# Patient Record
Sex: MALE | Race: ASIAN | NOT HISPANIC OR LATINO | URBAN - METROPOLITAN AREA
[De-identification: names, ages, dates, MRNs, and addresses within clinical notes are randomized per-mention and may not be internally consistent; named-entity substitution may affect disease eponyms.]

---

## 2019-10-04 ENCOUNTER — HOSPITAL ENCOUNTER (INPATIENT)
Dept: SURGERY | Facility: CLINIC | Age: 64
Discharge: HOME | End: 2019-10-10
Attending: INTERNAL MEDICINE | Admitting: INTERNAL MEDICINE

## 2019-10-04 DIAGNOSIS — M62.81 GENERALIZED MUSCLE WEAKNESS: ICD-10-CM

## 2019-10-04 DIAGNOSIS — G89.18 POSTOPERATIVE PAIN: ICD-10-CM

## 2019-10-04 DIAGNOSIS — R10.11 ABDOMINAL PAIN, RIGHT UPPER QUADRANT: ICD-10-CM

## 2019-10-04 DIAGNOSIS — R10.11 RIGHT UPPER QUADRANT ABDOMINAL PAIN: ICD-10-CM

## 2019-10-04 DIAGNOSIS — E11.9 TYPE 2 DIABETES MELLITUS WITHOUT COMPLICATION, WITHOUT LONG-TERM CURRENT USE OF INSULIN (H): ICD-10-CM

## 2019-10-04 DIAGNOSIS — R79.89 ELEVATED LFTS: ICD-10-CM

## 2019-10-04 DIAGNOSIS — N17.9 AKI (ACUTE KIDNEY INJURY) (H): ICD-10-CM

## 2019-10-04 LAB
ALBUMIN SERPL-MCNC: 2.5 G/DL (ref 3.5–5)
ALBUMIN SERPL-MCNC: 3.3 G/DL (ref 3.5–5)
ALBUMIN UR-MCNC: ABNORMAL MG/DL
ALP SERPL-CCNC: 108 U/L (ref 45–120)
ALP SERPL-CCNC: 91 U/L (ref 45–120)
ALT SERPL W P-5'-P-CCNC: 54 U/L (ref 0–45)
ALT SERPL W P-5'-P-CCNC: 63 U/L (ref 0–45)
AMORPH CRY #/AREA URNS HPF: ABNORMAL /[HPF]
ANION GAP SERPL CALCULATED.3IONS-SCNC: 5 MMOL/L (ref 5–18)
ANION GAP SERPL CALCULATED.3IONS-SCNC: 8 MMOL/L (ref 5–18)
APPEARANCE UR: ABNORMAL
AST SERPL W P-5'-P-CCNC: 65 U/L (ref 0–40)
AST SERPL W P-5'-P-CCNC: 84 U/L (ref 0–40)
ATRIAL RATE - MUSE: 108 BPM
BACTERIA #/AREA URNS HPF: ABNORMAL HPF
BASOPHILS # BLD AUTO: 0 THOU/UL (ref 0–0.2)
BASOPHILS # BLD AUTO: 0 THOU/UL (ref 0–0.2)
BASOPHILS NFR BLD AUTO: 0 % (ref 0–2)
BASOPHILS NFR BLD AUTO: 0 % (ref 0–2)
BILIRUB SERPL-MCNC: 0.6 MG/DL (ref 0–1)
BILIRUB SERPL-MCNC: 1 MG/DL (ref 0–1)
BILIRUB UR QL STRIP: ABNORMAL
BUN SERPL-MCNC: 14 MG/DL (ref 8–22)
BUN SERPL-MCNC: 16 MG/DL (ref 8–22)
CALCIUM SERPL-MCNC: 7.7 MG/DL (ref 8.5–10.5)
CALCIUM SERPL-MCNC: 9.1 MG/DL (ref 8.5–10.5)
CHLORIDE BLD-SCNC: 103 MMOL/L (ref 98–107)
CHLORIDE BLD-SCNC: 109 MMOL/L (ref 98–107)
CO2 SERPL-SCNC: 22 MMOL/L (ref 22–31)
CO2 SERPL-SCNC: 25 MMOL/L (ref 22–31)
COLOR UR AUTO: YELLOW
CREAT SERPL-MCNC: 1.13 MG/DL (ref 0.7–1.3)
CREAT SERPL-MCNC: 1.4 MG/DL (ref 0.7–1.3)
DIASTOLIC BLOOD PRESSURE - MUSE: 58 MMHG
DOHLE BODIES: ABNORMAL
EOSINOPHIL # BLD AUTO: 0.1 THOU/UL (ref 0–0.4)
EOSINOPHIL COUNT (ABSOLUTE): 0 THOU/UL (ref 0–0.4)
EOSINOPHIL NFR BLD AUTO: 2 % (ref 0–6)
EOSINOPHIL NFR BLD AUTO: 2 % (ref 0–6)
ERYTHROCYTE [DISTWIDTH] IN BLOOD BY AUTOMATED COUNT: 12.8 % (ref 11–14.5)
ERYTHROCYTE [DISTWIDTH] IN BLOOD BY AUTOMATED COUNT: 12.8 % (ref 11–14.5)
GFR SERPL CREATININE-BSD FRML MDRD: 52 ML/MIN/1.73M2
GFR SERPL CREATININE-BSD FRML MDRD: >60 ML/MIN/1.73M2
GLUCOSE BLD-MCNC: 138 MG/DL (ref 70–125)
GLUCOSE BLD-MCNC: 92 MG/DL (ref 70–125)
GLUCOSE BLDC GLUCOMTR-MCNC: 80 MG/DL (ref 70–139)
GLUCOSE BLDC GLUCOMTR-MCNC: 98 MG/DL (ref 70–139)
GLUCOSE UR STRIP-MCNC: ABNORMAL MG/DL
GRAN CASTS #/AREA URNS LPF: ABNORMAL LPF
HCT VFR BLD AUTO: 33.8 % (ref 40–54)
HCT VFR BLD AUTO: 42.1 % (ref 40–54)
HGB BLD-MCNC: 11.3 G/DL (ref 14–18)
HGB BLD-MCNC: 14.1 G/DL (ref 14–18)
HGB UR QL STRIP: NEGATIVE
INR PPP: 1.43 (ref 0.9–1.1)
INTERPRETATION ECG - MUSE: NORMAL
KETONES UR STRIP-MCNC: NEGATIVE MG/DL
LACTATE SERPL-SCNC: 1 MMOL/L (ref 0.5–2.2)
LACTATE SERPL-SCNC: 1.6 MMOL/L (ref 0.5–2.2)
LACTATE SERPL-SCNC: 2.6 MMOL/L (ref 0.5–2.2)
LEUKOCYTE ESTERASE UR QL STRIP: NEGATIVE
LYMPHOCYTES # BLD AUTO: 0.3 THOU/UL (ref 0.8–4.4)
LYMPHOCYTES # BLD AUTO: 0.7 THOU/UL (ref 0.8–4.4)
LYMPHOCYTES NFR BLD AUTO: 15 % (ref 20–40)
LYMPHOCYTES NFR BLD AUTO: 22 % (ref 20–40)
MCH RBC QN AUTO: 32.1 PG (ref 27–34)
MCH RBC QN AUTO: 32.1 PG (ref 27–34)
MCHC RBC AUTO-ENTMCNC: 33.4 G/DL (ref 32–36)
MCHC RBC AUTO-ENTMCNC: 33.5 G/DL (ref 32–36)
MCV RBC AUTO: 96 FL (ref 80–100)
MCV RBC AUTO: 96 FL (ref 80–100)
MONOCYTES # BLD AUTO: 0.1 THOU/UL (ref 0–0.9)
MONOCYTES # BLD AUTO: 0.1 THOU/UL (ref 0–0.9)
MONOCYTES NFR BLD AUTO: 3 % (ref 2–10)
MONOCYTES NFR BLD AUTO: 8 % (ref 2–10)
MUCOUS THREADS #/AREA URNS LPF: ABNORMAL LPF
NEUTROPHILS # BLD AUTO: 2.2 THOU/UL (ref 2–7.7)
NEUTROPHILS NFR BLD AUTO: 72 % (ref 50–70)
NITRATE UR QL: NEGATIVE
OVALOCYTES: ABNORMAL
OVALOCYTES: ABNORMAL
P AXIS - MUSE: 56 DEGREES
PH UR STRIP: 6 [PH] (ref 4.5–8)
PLAT MORPH BLD: ABNORMAL
PLAT MORPH BLD: ABNORMAL
PLATELET # BLD AUTO: 68 THOU/UL (ref 140–440)
PLATELET # BLD AUTO: 90 THOU/UL (ref 140–440)
PMV BLD AUTO: 10.4 FL (ref 8.5–12.5)
PMV BLD AUTO: 10.5 FL (ref 8.5–12.5)
POLYCHROMASIA BLD QL SMEAR: ABNORMAL
POTASSIUM BLD-SCNC: 4 MMOL/L (ref 3.5–5)
POTASSIUM BLD-SCNC: 4.1 MMOL/L (ref 3.5–5)
PR INTERVAL - MUSE: 122 MS
PROT SERPL-MCNC: 5.2 G/DL (ref 6–8)
PROT SERPL-MCNC: 6.6 G/DL (ref 6–8)
QRS DURATION - MUSE: 80 MS
QT - MUSE: 302 MS
QTC - MUSE: 404 MS
R AXIS - MUSE: -54 DEGREES
RBC # BLD AUTO: 3.52 MILL/UL (ref 4.4–6.2)
RBC # BLD AUTO: 4.39 MILL/UL (ref 4.4–6.2)
RBC #/AREA URNS AUTO: ABNORMAL HPF
SODIUM SERPL-SCNC: 136 MMOL/L (ref 136–145)
SODIUM SERPL-SCNC: 136 MMOL/L (ref 136–145)
SP GR UR STRIP: 1.02 (ref 1–1.03)
SQUAMOUS #/AREA URNS AUTO: ABNORMAL LPF
SYSTOLIC BLOOD PRESSURE - MUSE: 110 MMHG
T AXIS - MUSE: 55 DEGREES
TOTAL NEUTROPHILS-ABS(DIFF): 1.4 THOU/UL (ref 2–7.7)
TOTAL NEUTROPHILS-REL(DIFF): 75 % (ref 50–70)
UROBILINOGEN UR STRIP-ACNC: ABNORMAL
VENTRICULAR RATE- MUSE: 108 BPM
WBC #/AREA URNS AUTO: ABNORMAL HPF
WBC: 1.8 THOU/UL (ref 4–11)
WBC: 3 THOU/UL (ref 4–11)

## 2019-10-04 RX ORDER — ACETAMINOPHEN 325 MG/1
325-650 TABLET ORAL EVERY 4 HOURS PRN
Status: SHIPPED | COMMUNITY
Start: 2019-10-04

## 2019-10-04 ASSESSMENT — MIFFLIN-ST. JEOR
SCORE: 1193.62
SCORE: 1193.62
SCORE: 1077.96
SCORE: 1077.96

## 2019-10-05 ENCOUNTER — SURGERY - HEALTHEAST (OUTPATIENT)
Dept: SURGERY | Facility: CLINIC | Age: 64
End: 2019-10-05

## 2019-10-05 ENCOUNTER — ANESTHESIA - HEALTHEAST (OUTPATIENT)
Dept: SURGERY | Facility: CLINIC | Age: 64
End: 2019-10-05

## 2019-10-05 LAB
ABO/RH(D): NORMAL
ALBUMIN SERPL-MCNC: 2.4 G/DL (ref 3.5–5)
ALBUMIN SERPL-MCNC: 2.4 G/DL (ref 3.5–5)
ALBUMIN SERPL-MCNC: 3.1 G/DL (ref 3.5–5)
ALBUMIN UR-MCNC: ABNORMAL MG/DL
ALP SERPL-CCNC: 116 U/L (ref 45–120)
ALP SERPL-CCNC: 87 U/L (ref 45–120)
ALP SERPL-CCNC: 88 U/L (ref 45–120)
ALT SERPL W P-5'-P-CCNC: 52 U/L (ref 0–45)
ALT SERPL W P-5'-P-CCNC: 53 U/L (ref 0–45)
ALT SERPL W P-5'-P-CCNC: 72 U/L (ref 0–45)
ANION GAP SERPL CALCULATED.3IONS-SCNC: 14 MMOL/L (ref 5–18)
ANION GAP SERPL CALCULATED.3IONS-SCNC: 6 MMOL/L (ref 5–18)
APPEARANCE UR: CLEAR
AST SERPL W P-5'-P-CCNC: 60 U/L (ref 0–40)
AST SERPL W P-5'-P-CCNC: 61 U/L (ref 0–40)
AST SERPL W P-5'-P-CCNC: 90 U/L (ref 0–40)
BACTERIA #/AREA URNS HPF: ABNORMAL HPF
BASE EXCESS BLDA CALC-SCNC: -2.7 MMOL/L
BASOPHILS # BLD AUTO: 0 THOU/UL (ref 0–0.2)
BASOPHILS NFR BLD AUTO: 0 % (ref 0–2)
BILIRUB DIRECT SERPL-MCNC: 0.3 MG/DL
BILIRUB SERPL-MCNC: 0.4 MG/DL (ref 0–1)
BILIRUB SERPL-MCNC: 0.4 MG/DL (ref 0–1)
BILIRUB SERPL-MCNC: 0.7 MG/DL (ref 0–1)
BILIRUB UR QL STRIP: NEGATIVE
BUN SERPL-MCNC: 13 MG/DL (ref 8–22)
BUN SERPL-MCNC: 14 MG/DL (ref 8–22)
C REACTIVE PROTEIN LHE: 14.8 MG/DL (ref 0–0.8)
CALCIUM SERPL-MCNC: 7.5 MG/DL (ref 8.5–10.5)
CALCIUM SERPL-MCNC: 8.2 MG/DL (ref 8.5–10.5)
CHLORIDE BLD-SCNC: 104 MMOL/L (ref 98–107)
CHLORIDE BLD-SCNC: 108 MMOL/L (ref 98–107)
CK SERPL-CCNC: 169 U/L (ref 30–190)
CK SERPL-CCNC: 207 U/L (ref 30–190)
CO2 SERPL-SCNC: 19 MMOL/L (ref 22–31)
CO2 SERPL-SCNC: 21 MMOL/L (ref 22–31)
COHGB MFR BLD: 100 % (ref 96–97)
COLOR UR AUTO: YELLOW
CORTIS SERPL-MCNC: 16.6 UG/DL
CREAT SERPL-MCNC: 1.02 MG/DL (ref 0.7–1.3)
CREAT SERPL-MCNC: 1.12 MG/DL (ref 0.7–1.3)
EOSINOPHIL COUNT (ABSOLUTE): 0.1 THOU/UL (ref 0–0.4)
EOSINOPHIL NFR BLD AUTO: 3 % (ref 0–6)
ERYTHROCYTE [DISTWIDTH] IN BLOOD BY AUTOMATED COUNT: 12.9 % (ref 11–14.5)
ERYTHROCYTE [DISTWIDTH] IN BLOOD BY AUTOMATED COUNT: 13 % (ref 11–14.5)
GFR SERPL CREATININE-BSD FRML MDRD: >60 ML/MIN/1.73M2
GFR SERPL CREATININE-BSD FRML MDRD: >60 ML/MIN/1.73M2
GLUCOSE BLD-MCNC: 139 MG/DL (ref 70–125)
GLUCOSE BLD-MCNC: 208 MG/DL (ref 70–125)
GLUCOSE BLDC GLUCOMTR-MCNC: 101 MG/DL (ref 70–139)
GLUCOSE BLDC GLUCOMTR-MCNC: 105 MG/DL (ref 70–139)
GLUCOSE BLDC GLUCOMTR-MCNC: 107 MG/DL (ref 70–139)
GLUCOSE BLDC GLUCOMTR-MCNC: 134 MG/DL (ref 70–139)
GLUCOSE BLDC GLUCOMTR-MCNC: 142 MG/DL (ref 70–139)
GLUCOSE BLDC GLUCOMTR-MCNC: 145 MG/DL (ref 70–139)
GLUCOSE BLDC GLUCOMTR-MCNC: 209 MG/DL (ref 70–139)
GLUCOSE BLDC GLUCOMTR-MCNC: 61 MG/DL (ref 70–139)
GLUCOSE UR STRIP-MCNC: NEGATIVE MG/DL
HCO3, ARTERIAL CALC - HISTORICAL: 22.8 MMOL/L (ref 23–29)
HCT VFR BLD AUTO: 33.4 % (ref 40–54)
HCT VFR BLD AUTO: 40.3 % (ref 40–54)
HGB BLD-MCNC: 11.3 G/DL (ref 14–18)
HGB BLD-MCNC: 12.7 G/DL (ref 14–18)
HGB UR QL STRIP: ABNORMAL
INR PPP: 1.48 (ref 0.9–1.1)
KETONES UR STRIP-MCNC: ABNORMAL MG/DL
LACTATE SERPL-SCNC: 1 MMOL/L (ref 0.5–2.2)
LACTATE SERPL-SCNC: 8 MMOL/L (ref 0.5–2.2)
LEUKOCYTE ESTERASE UR QL STRIP: NEGATIVE
LIPASE SERPL-CCNC: 33 U/L (ref 0–52)
LYMPHOCYTES # BLD AUTO: 1.4 THOU/UL (ref 0.8–4.4)
LYMPHOCYTES NFR BLD AUTO: 46 % (ref 20–40)
MCH RBC QN AUTO: 31.6 PG (ref 27–34)
MCH RBC QN AUTO: 32.4 PG (ref 27–34)
MCHC RBC AUTO-ENTMCNC: 31.5 G/DL (ref 32–36)
MCHC RBC AUTO-ENTMCNC: 33.8 G/DL (ref 32–36)
MCV RBC AUTO: 100 FL (ref 80–100)
MCV RBC AUTO: 96 FL (ref 80–100)
METAMYELOCYTES (ABSOLUTE): 0 THOU/UL
METAMYELOCYTES NFR BLD MANUAL: 1 %
MONOCYTES # BLD AUTO: 0.2 THOU/UL (ref 0–0.9)
MONOCYTES NFR BLD AUTO: 5 % (ref 2–10)
MONOCYTES NFR BLD AUTO: NEGATIVE %
NITRATE UR QL: NEGATIVE
O2/TOTAL GAS SETTING VFR VENT: 70 %
OVALOCYTES: ABNORMAL
OXYHEMOGLOBIN - HISTORICAL: 96.6 % (ref 96–97)
PCO2 BLD: 35 MM HG (ref 35–45)
PEEP: 6 CM H2O
PH BLD: 7.4 [PH] (ref 7.37–7.44)
PH UR STRIP: 6 [PH] (ref 4.5–8)
PLAT MORPH BLD: NORMAL
PLATELET # BLD AUTO: 147 THOU/UL (ref 140–440)
PLATELET # BLD AUTO: 56 THOU/UL (ref 140–440)
PMV BLD AUTO: 10.1 FL (ref 8.5–12.5)
PMV BLD AUTO: 10.9 FL (ref 8.5–12.5)
PO2 BLD: 74 MM HG (ref 80–90)
POTASSIUM BLD-SCNC: 3.8 MMOL/L (ref 3.5–5)
POTASSIUM BLD-SCNC: 4.6 MMOL/L (ref 3.5–5)
PROLACTIN SERPL-MCNC: 12.4 NG/ML (ref 0–15)
PROT SERPL-MCNC: 5 G/DL (ref 6–8)
PROT SERPL-MCNC: 5 G/DL (ref 6–8)
PROT SERPL-MCNC: 6.5 G/DL (ref 6–8)
PSV (LAB BLOOD GAS): 6 CM H2O
RATE: 16 RR/MIN
RBC # BLD AUTO: 3.49 MILL/UL (ref 4.4–6.2)
RBC # BLD AUTO: 4.02 MILL/UL (ref 4.4–6.2)
RBC #/AREA URNS AUTO: ABNORMAL HPF
SODIUM SERPL-SCNC: 135 MMOL/L (ref 136–145)
SODIUM SERPL-SCNC: 137 MMOL/L (ref 136–145)
SP GR UR STRIP: 1.05 (ref 1–1.03)
SQUAMOUS #/AREA URNS AUTO: ABNORMAL LPF
T4 FREE SERPL-MCNC: 0.9 NG/DL (ref 0.7–1.8)
TEMPERATURE: 37 DEGREES C
TOTAL NEUTROPHILS-ABS(DIFF): 1.4 THOU/UL (ref 2–7.7)
TOTAL NEUTROPHILS-REL(DIFF): 46 % (ref 50–70)
TROPONIN I SERPL-MCNC: <0.01 NG/ML (ref 0–0.29)
TSH SERPL DL<=0.005 MIU/L-ACNC: 2.39 UIU/ML (ref 0.3–5)
UROBILINOGEN UR STRIP-ACNC: ABNORMAL
VENTILATION MODE: ABNORMAL
WBC #/AREA URNS AUTO: ABNORMAL HPF
WBC: 1.8 THOU/UL (ref 4–11)
WBC: 3 THOU/UL (ref 4–11)

## 2019-10-06 LAB
ALBUMIN SERPL-MCNC: 2.4 G/DL (ref 3.5–5)
ALP SERPL-CCNC: 81 U/L (ref 45–120)
ALT SERPL W P-5'-P-CCNC: 58 U/L (ref 0–45)
ANION GAP SERPL CALCULATED.3IONS-SCNC: 7 MMOL/L (ref 5–18)
AST SERPL W P-5'-P-CCNC: 75 U/L (ref 0–40)
BASOPHILS # BLD AUTO: 0 THOU/UL (ref 0–0.2)
BASOPHILS NFR BLD AUTO: 0 % (ref 0–2)
BILIRUB SERPL-MCNC: 0.5 MG/DL (ref 0–1)
BLD PROD TYP BPU: NORMAL
BLD PROD TYP BPU: NORMAL
BLOOD EXPIRATION DATE: NORMAL
BLOOD EXPIRATION DATE: NORMAL
BLOOD TYPE: 5100
BLOOD TYPE: 7300
BUN SERPL-MCNC: 15 MG/DL (ref 8–22)
CALCIUM SERPL-MCNC: 7.6 MG/DL (ref 8.5–10.5)
CHLORIDE BLD-SCNC: 107 MMOL/L (ref 98–107)
CO2 SERPL-SCNC: 24 MMOL/L (ref 22–31)
CODING SYSTEM: NORMAL
CODING SYSTEM: NORMAL
COMPONENT (HISTORICAL CONVERSION): NORMAL
COMPONENT (HISTORICAL CONVERSION): NORMAL
CREAT SERPL-MCNC: 0.96 MG/DL (ref 0.7–1.3)
EOSINOPHIL COUNT (ABSOLUTE): 0 THOU/UL (ref 0–0.4)
EOSINOPHIL NFR BLD AUTO: 0 % (ref 0–6)
ERYTHROCYTE [DISTWIDTH] IN BLOOD BY AUTOMATED COUNT: 12.9 % (ref 11–14.5)
GFR SERPL CREATININE-BSD FRML MDRD: >60 ML/MIN/1.73M2
GLUCOSE BLD-MCNC: 79 MG/DL (ref 70–125)
GLUCOSE BLDC GLUCOMTR-MCNC: 77 MG/DL (ref 70–139)
GLUCOSE BLDC GLUCOMTR-MCNC: 78 MG/DL (ref 70–139)
GLUCOSE BLDC GLUCOMTR-MCNC: 83 MG/DL (ref 70–139)
GLUCOSE BLDC GLUCOMTR-MCNC: 86 MG/DL (ref 70–139)
HBA1C MFR BLD: 7.5 % (ref 4.2–6.1)
HCT VFR BLD AUTO: 29.9 % (ref 40–54)
HGB BLD-MCNC: 10 G/DL (ref 14–18)
ISSUE DATE AND TIME: NORMAL
ISSUE DATE AND TIME: NORMAL
LYMPHOCYTES # BLD AUTO: 1.1 THOU/UL (ref 0.8–4.4)
LYMPHOCYTES NFR BLD AUTO: 21 % (ref 20–40)
MAGNESIUM SERPL-MCNC: 1.5 MG/DL (ref 1.8–2.6)
MCH RBC QN AUTO: 31.6 PG (ref 27–34)
MCHC RBC AUTO-ENTMCNC: 33.4 G/DL (ref 32–36)
MCV RBC AUTO: 95 FL (ref 80–100)
MONOCYTES # BLD AUTO: 0.1 THOU/UL (ref 0–0.9)
MONOCYTES NFR BLD AUTO: 1 % (ref 2–10)
OVALOCYTES: ABNORMAL
PLAT MORPH BLD: ABNORMAL
PLATELET # BLD AUTO: 95 THOU/UL (ref 140–440)
PMV BLD AUTO: 10.9 FL (ref 8.5–12.5)
POTASSIUM BLD-SCNC: 3.9 MMOL/L (ref 3.5–5)
PROT SERPL-MCNC: 5.1 G/DL (ref 6–8)
RBC # BLD AUTO: 3.16 MILL/UL (ref 4.4–6.2)
SODIUM SERPL-SCNC: 138 MMOL/L (ref 136–145)
STATUS (HISTORICAL CONVERSION): NORMAL
STATUS (HISTORICAL CONVERSION): NORMAL
TOTAL NEUTROPHILS-ABS(DIFF): 4.1 THOU/UL (ref 2–7.7)
TOTAL NEUTROPHILS-REL(DIFF): 78 % (ref 50–70)
UNIT ABO/RH (HISTORICAL CONVERSION): NORMAL
UNIT ABO/RH (HISTORICAL CONVERSION): NORMAL
UNIT NUMBER: NORMAL
UNIT NUMBER: NORMAL
WBC: 5.2 THOU/UL (ref 4–11)

## 2019-10-06 ASSESSMENT — MIFFLIN-ST. JEOR
SCORE: 1116.06
SCORE: 1116.06

## 2019-10-07 LAB
25(OH)D3 SERPL-MCNC: 16.5 NG/ML (ref 30–80)
ALBUMIN SERPL-MCNC: 2.3 G/DL (ref 3.5–5)
ALP SERPL-CCNC: 81 U/L (ref 45–120)
ALT SERPL W P-5'-P-CCNC: 55 U/L (ref 0–45)
ANION GAP SERPL CALCULATED.3IONS-SCNC: 8 MMOL/L (ref 5–18)
AST SERPL W P-5'-P-CCNC: 67 U/L (ref 0–40)
BASOPHILS # BLD AUTO: 0 THOU/UL (ref 0–0.2)
BASOPHILS NFR BLD AUTO: 0 % (ref 0–2)
BILIRUB SERPL-MCNC: 0.5 MG/DL (ref 0–1)
BUN SERPL-MCNC: 15 MG/DL (ref 8–22)
CALCIUM SERPL-MCNC: 7.4 MG/DL (ref 8.5–10.5)
CHLORIDE BLD-SCNC: 107 MMOL/L (ref 98–107)
CO2 SERPL-SCNC: 22 MMOL/L (ref 22–31)
CREAT SERPL-MCNC: 0.84 MG/DL (ref 0.7–1.3)
EOSINOPHIL # BLD AUTO: 0.1 THOU/UL (ref 0–0.4)
EOSINOPHIL NFR BLD AUTO: 2 % (ref 0–6)
ERYTHROCYTE [DISTWIDTH] IN BLOOD BY AUTOMATED COUNT: 13.2 % (ref 11–14.5)
GFR SERPL CREATININE-BSD FRML MDRD: >60 ML/MIN/1.73M2
GLUCOSE BLD-MCNC: 55 MG/DL (ref 70–125)
GLUCOSE BLDC GLUCOMTR-MCNC: 107 MG/DL (ref 70–139)
GLUCOSE BLDC GLUCOMTR-MCNC: 109 MG/DL (ref 70–139)
GLUCOSE BLDC GLUCOMTR-MCNC: 148 MG/DL (ref 70–139)
GLUCOSE BLDC GLUCOMTR-MCNC: 168 MG/DL (ref 70–139)
GLUCOSE BLDC GLUCOMTR-MCNC: 185 MG/DL (ref 70–139)
HCT VFR BLD AUTO: 28.4 % (ref 40–54)
HGB BLD-MCNC: 9.5 G/DL (ref 14–18)
LYMPHOCYTES # BLD AUTO: 1.1 THOU/UL (ref 0.8–4.4)
LYMPHOCYTES NFR BLD AUTO: 25 % (ref 20–40)
MAGNESIUM SERPL-MCNC: 1.9 MG/DL (ref 1.8–2.6)
MCH RBC QN AUTO: 32.1 PG (ref 27–34)
MCHC RBC AUTO-ENTMCNC: 33.5 G/DL (ref 32–36)
MCV RBC AUTO: 96 FL (ref 80–100)
MONOCYTES # BLD AUTO: 0.2 THOU/UL (ref 0–0.9)
MONOCYTES NFR BLD AUTO: 4 % (ref 2–10)
NEUTROPHILS # BLD AUTO: 3.1 THOU/UL (ref 2–7.7)
NEUTROPHILS NFR BLD AUTO: 68 % (ref 50–70)
PLATELET # BLD AUTO: 87 THOU/UL (ref 140–440)
PMV BLD AUTO: 10.8 FL (ref 8.5–12.5)
POTASSIUM BLD-SCNC: 3.5 MMOL/L (ref 3.5–5)
PROCALCITONIN SERPL-MCNC: 28.43 NG/ML (ref 0–0.49)
PROT SERPL-MCNC: 4.8 G/DL (ref 6–8)
RBC # BLD AUTO: 2.96 MILL/UL (ref 4.4–6.2)
SODIUM SERPL-SCNC: 137 MMOL/L (ref 136–145)
WBC: 4.5 THOU/UL (ref 4–11)

## 2019-10-07 ASSESSMENT — MIFFLIN-ST. JEOR
SCORE: 1105.17
SCORE: 1105.17

## 2019-10-08 LAB
CMV IGM SERPL IA-ACNC: <0.2 AI (ref 0–0.8)
GLUCOSE BLDC GLUCOMTR-MCNC: 142 MG/DL (ref 70–139)
GLUCOSE BLDC GLUCOMTR-MCNC: 162 MG/DL (ref 70–139)
GLUCOSE BLDC GLUCOMTR-MCNC: 181 MG/DL (ref 70–139)
GLUCOSE BLDC GLUCOMTR-MCNC: 92 MG/DL (ref 70–139)
INFLUENZA VIRUS A IGG AB [TITER] IN SERUM: ABNORMAL {TITER}
INFLUENZA VIRUS A IGM AB [TITER] IN SERUM: ABNORMAL {TITER}
LAB AP CHARGES (HE HISTORICAL CONVERSION): NORMAL
PATH REPORT.COMMENTS IMP SPEC: NORMAL
PATH REPORT.FINAL DX SPEC: NORMAL
PATH REPORT.GROSS SPEC: NORMAL
PATH REPORT.MICROSCOPIC SPEC OTHER STN: NORMAL
PATH REPORT.RELEVANT HX SPEC: NORMAL
PROCALCITONIN SERPL-MCNC: 9.44 NG/ML (ref 0–0.49)
RESULT FLAG (HE HISTORICAL CONVERSION): NORMAL

## 2019-10-09 LAB
AEROBIC BLOOD CULTURE BOTTLE: NO GROWTH
ANAEROBIC BLOOD CULTURE BOTTLE: NO GROWTH
ANAEROBIC BLOOD CULTURE BOTTLE: NO GROWTH
ANAEROBIC BLOOD CULTURE BOTTLE: NORMAL
GLUCOSE BLDC GLUCOMTR-MCNC: 148 MG/DL (ref 70–139)
GLUCOSE BLDC GLUCOMTR-MCNC: 156 MG/DL (ref 70–139)
GLUCOSE BLDC GLUCOMTR-MCNC: 165 MG/DL (ref 70–139)
GLUCOSE BLDC GLUCOMTR-MCNC: 99 MG/DL (ref 70–139)
INR PPP: 1.16 (ref 0.9–1.1)
PROCALCITONIN SERPL-MCNC: 7.23 NG/ML (ref 0–0.49)

## 2019-10-10 LAB — GLUCOSE BLDC GLUCOMTR-MCNC: 111 MG/DL (ref 70–139)

## 2019-10-10 RX ORDER — OXYCODONE HYDROCHLORIDE 5 MG/1
5 TABLET ORAL EVERY 4 HOURS PRN
Qty: 5 TABLET | Refills: 0 | Status: SHIPPED | OUTPATIENT
Start: 2019-10-10

## 2019-10-21 ENCOUNTER — AMBULATORY - HEALTHEAST (OUTPATIENT)
Dept: LAB | Facility: CLINIC | Age: 64
End: 2019-10-21

## 2019-10-21 ENCOUNTER — AMBULATORY - HEALTHEAST (OUTPATIENT)
Dept: SURGERY | Facility: CLINIC | Age: 64
End: 2019-10-21

## 2019-10-21 ENCOUNTER — OFFICE VISIT - HEALTHEAST (OUTPATIENT)
Dept: SURGERY | Facility: CLINIC | Age: 64
End: 2019-10-21

## 2019-10-21 DIAGNOSIS — R10.13 EPIGASTRIC PAIN: ICD-10-CM

## 2019-10-21 DIAGNOSIS — Z48.89 POSTOPERATIVE VISIT: ICD-10-CM

## 2019-10-21 LAB
ALBUMIN SERPL-MCNC: 3.9 G/DL (ref 3.5–5)
ALP SERPL-CCNC: 96 U/L (ref 45–120)
ALT SERPL W P-5'-P-CCNC: 30 U/L (ref 0–45)
AST SERPL W P-5'-P-CCNC: 19 U/L (ref 0–40)
BILIRUB DIRECT SERPL-MCNC: 0.2 MG/DL
BILIRUB SERPL-MCNC: 0.5 MG/DL (ref 0–1)
LIPASE SERPL-CCNC: 303 U/L (ref 0–52)
PROT SERPL-MCNC: 7.6 G/DL (ref 6–8)

## 2019-10-21 RX ORDER — METOCLOPRAMIDE 5 MG/1
5 TABLET ORAL
Qty: 90 TABLET | Refills: 0 | Status: SHIPPED | OUTPATIENT
Start: 2019-10-21

## 2019-10-21 ASSESSMENT — MIFFLIN-ST. JEOR: SCORE: 1023.53

## 2019-10-22 ENCOUNTER — AMBULATORY - HEALTHEAST (OUTPATIENT)
Dept: SURGERY | Facility: CLINIC | Age: 64
End: 2019-10-22

## 2019-10-22 ENCOUNTER — COMMUNICATION - HEALTHEAST (OUTPATIENT)
Dept: SURGERY | Facility: CLINIC | Age: 64
End: 2019-10-22

## 2019-10-22 DIAGNOSIS — R10.13 EPIGASTRIC ABDOMINAL PAIN: ICD-10-CM

## 2019-10-22 LAB
BASOPHILS # BLD AUTO: 0.1 THOU/UL (ref 0–0.2)
BASOPHILS NFR BLD AUTO: 1 % (ref 0–2)
EOSINOPHIL COUNT (ABSOLUTE): 0.1 THOU/UL (ref 0–0.4)
EOSINOPHIL NFR BLD AUTO: 2 % (ref 0–6)
ERYTHROCYTE [DISTWIDTH] IN BLOOD BY AUTOMATED COUNT: 13.6 % (ref 11–14.5)
HCT VFR BLD AUTO: 37.7 % (ref 40–54)
HGB BLD-MCNC: 12.4 G/DL (ref 14–18)
LYMPHOCYTES # BLD AUTO: 4.2 THOU/UL (ref 0.8–4.4)
LYMPHOCYTES NFR BLD AUTO: 61 % (ref 20–40)
MCH RBC QN AUTO: 32.5 PG (ref 27–34)
MCHC RBC AUTO-ENTMCNC: 32.9 G/DL (ref 32–36)
MCV RBC AUTO: 99 FL (ref 80–100)
MONOCYTES # BLD AUTO: 0.6 THOU/UL (ref 0–0.9)
MONOCYTES NFR BLD AUTO: 9 % (ref 2–10)
OVALOCYTES: ABNORMAL
PLAT MORPH BLD: NORMAL
PLATELET # BLD AUTO: 289 THOU/UL (ref 140–440)
PMV BLD AUTO: 10.4 FL (ref 8.5–12.5)
RBC # BLD AUTO: 3.82 MILL/UL (ref 4.4–6.2)
TOTAL NEUTROPHILS-ABS(DIFF): 2 THOU/UL (ref 2–7.7)
TOTAL NEUTROPHILS-REL(DIFF): 29 % (ref 50–70)
WBC: 7 THOU/UL (ref 4–11)

## 2019-10-23 ENCOUNTER — HOSPITAL ENCOUNTER (OUTPATIENT)
Dept: ULTRASOUND IMAGING | Facility: HOSPITAL | Age: 64
Discharge: HOME | End: 2019-10-23
Attending: PHYSICIAN ASSISTANT

## 2019-10-23 ENCOUNTER — RECORDS - HEALTHEAST (OUTPATIENT)
Dept: ADMINISTRATIVE | Facility: OTHER | Age: 64
End: 2019-10-23

## 2019-10-23 DIAGNOSIS — R10.13 EPIGASTRIC PAIN: ICD-10-CM

## 2019-10-28 ENCOUNTER — HOSPITAL ENCOUNTER (OUTPATIENT)
Dept: NUCLEAR MEDICINE | Facility: HOSPITAL | Age: 64
Discharge: HOME | End: 2019-10-28
Attending: PHYSICIAN ASSISTANT

## 2019-10-28 DIAGNOSIS — R10.13 EPIGASTRIC PAIN: ICD-10-CM

## 2020-04-22 ENCOUNTER — RECORDS - HEALTHEAST (OUTPATIENT)
Dept: ADMINISTRATIVE | Facility: OTHER | Age: 65
End: 2020-04-22

## 2020-06-19 ENCOUNTER — RECORDS - HEALTHEAST (OUTPATIENT)
Dept: ADMINISTRATIVE | Facility: OTHER | Age: 65
End: 2020-06-19

## 2020-06-22 ENCOUNTER — HOSPITAL ENCOUNTER (OUTPATIENT)
Dept: ULTRASOUND IMAGING | Facility: HOSPITAL | Age: 65
Discharge: HOME | End: 2020-06-22

## 2020-06-22 DIAGNOSIS — B18.1 CHRONIC HEPATITIS B (H): ICD-10-CM

## 2021-06-02 NOTE — PROGRESS NOTES
Hospitalist Progress Note     Assessment/Plan:     Amish Chua 63-year-old male medical history of,, chronic who presented with right upper quadrant abdominal pain, fever, and  significant leukopenia concerning for severe sepsis.  MRCP was performed which showed dilated CBD with inability to rule out ampullary stricture on MRCP.  Due to suspicion for probable cholecystitis he underwent laparoscopic cholecystectomy on 10/5/2019.  Postoperatively, he developed severe abdominal pain, tachycardia, and respiratory distress.  CT abdomen/pelvis did not show any postoperative complications, perforation, or gas.  Lactic acid was 8 and the patient had recurrent fever to 103.  Antibiotic coverage was broadened from initial Zosyn to include vancomycin.  He was subsequently transferred to the ICU for close monitoring of his respiratory status.  He subsequently recovered without any infectious source identified.  Currently doing well with return of bowel function to normal and able to tolerate p.o. intake.  Antibiotics D escalated to IV Zosyn with plan to discontinue tomorrow if procalcitonin is negative.    Active Problem:   Possible sepsis secondary to cholecystitis:  Leukopenia with mild neutropenia, resolved:  Thrombocytopenia likely due to underlying liver disease:  Chronic hepatitis B:  Coagulopathy of liver disease:  Acute hypoxic failure, resolved:  -Seems like patient's initial presentation is consistent with infectious etiology possibly from chronic cholecystitis but clinical picture is not fully convincing.  Dilated CBD on MRCP but ALP/T bili are unremarkable.  Clinical picture not consistent with cholangitis either.  Chest x-ray with minimal pulmonary vascular congestion but no pneumonia.  Repeat CT abdomen pelvis during rapid response for severe abdominal pain just with postoperative changes.  UA unremarkable.  Symptoms of high fever, body aches, tachycardia, and lactic acidosis have now resolved.  Although,  procalcitonin is a 28 with no ongoing other signs of infection.  On broad-spectrum antibiotics with blood culture results remaining negative to date..  -No significant bleeding despite low platelets and elevated INR.  Received vitamin K and platelets preop/perioperatively.    Plan:  -Continue Zosyn and discontinue vancomycin. Repeat procalcitonin in the morning. Appreciate ID antibiotic stewardship input.  -General surgery involved.    Vitamin D deficiency:  -Level low here.  Start 10,000 IU daily x30 days.  Recheck lab in 1 to 2 months.    Dyslipidemia:  -Discontinued statin due to liver disease.    Type 2 diabetes mellitus::  -Holding glipizide and metformin.  Intermittently hypoglycemic.  Continue sliding scale insulin.    DVT prophylaxis: SCDs.    Subjective:     No acute events overnight.  Remains on room air with normal bowel function and tolerating adequate p.o. intake.  Abdominal pain much better controlled.  Hemodynamically stable.  Discussed with the patient and his wife.    Review of systems:     Please see Subjective.    Current Medications:     Scheduled Meds:    cholecalciferol (vitamin D3)  10,000 Units Oral DAILY     heparin (PF)  5,000 Units Subcutaneous Q8H FIXED TIMES     influenza vaccine (age 50-64 YR or egg allergy) FLUBLOK quad (PF) inj 2019-20  0.5 mL Intramuscular Prior to Discharge     insulin aspart (NovoLOG) injection   Subcutaneous TID with meals     piperacillin-tazobactam  3.375 g Intravenous Q8H     sodium chloride  10-30 mL Intravenous Q8H FIXED TIMES     Continuous Infusions:    PRN Meds:.acetaminophen, dextrose 50 % (D50W), glucagon (human recombinant), HYDROcodone-acetaminophen, HYDROmorphone, naloxone **OR** naloxone, sodium chloride bacteriostatic, sodium chloride bacteriostatic, sodium chloride bacteriostatic, sodium chloride, sodium chloride, sodium chloride    Objective:       Vital signs in last 24 hours:     Temp:  [97.5  F (36.4  C)-99.6  F (37.6  C)] 99.6  F (37.6   C)  Heart Rate:  [] 84  Resp:  [13-28] 16  BP: ()/(56-69) 114/63  Weight: 116 lb (52.6 kg)    Physical Exam:     General appearance: Alert, Awake, No distress   Head & Neck Atraumatic, supple, no tracheal deviation   ENT  PER, conjunctiva clear, no scleral icterus, EOMI, no nasal discharge   Resp:  Breathing labored, tachypnea, clear to auscultation bilaterally, no wheezing, no crackles,   CVS: S1, S2 normal, regular rate and regular rhythm, no murmurs   GI: Soft, nontender, ND and BS+   MSK: No swelling, or tenderness   Neuro:  Alert and oriented ×3     Intake/Output this shift:     I/O last 3 completed shifts:  In: 751 [P.O.:320; IV Piggyback:431]  Out: 525 [Urine:525]    Pertinent Labs:     Lab Results: personally reviewed.     Pertinent Radiology:       Advanced Care Planning:   More than 35 minutes were spent with the patient and family with more than 50% of the time spent on counseling and coordination of care.     Barrier to discharge: Possible sepsis.  Anticipated LOS: To be determined  Disposition: To be determined    Lora Cid M.D.  [facility]  [facility]

## 2021-06-02 NOTE — PROGRESS NOTES
Physical Therapy     10/07/19 1325   Visit Specifics   Eval Type Initial eval   Inital PT Consult 10/07/19    Patient declined ;Family interpreted   Bed/Tabs/Pad Alarm Applied Yes   General   Onset date 10/04/19   Additional Pertinent History S/p laparoscopic cholecystectomy on 10/5/2019   Chart Reviewed Yes   PT/OT Patient/Caregiver Stated Goals none stated   Family/Caregiver Present Yes   Treatment Time   Gait Training 8   Precautions   General Precautions Abdominal;Bed alarm/Tab alarm   Home Living   Type of Home House   Home Layout One level;Stairs to enter without rails;Laundry in basement   Mobility Equipment   (none)   Prior Status   Independent With All ADL's/IADL's   Prior Device Use None of the above   Indoor Mobility Independent   Lives With Family   Receives Help From Family   Vocational Full time employment   Device Used No   Leisure Interests Spiritual activities   RLE Assessment   RLE Assessment WFL   LLE Assessment   LLE Assessment WFL   Bed Mobility   Supine to Sit Mod assist;Log roll;HOB elevated;Verbal cues   Bed Mobility Comments pt moves slow, cues on log-roll technique w/ fair follow through, needing much assist to sit up at EOB   Transfer    Sit To Stand CGA;Stand by assistance;Verbal cues   Stand To Sit Stand by assistance;Verbal cues    Additional Transfer Toilet   Toilet Stand by assistance;Verbal cues   Transfer Comments cues on safe hand placement, fww in front of pt   Ambulation    Weight Bearing Status WBAT   Distance (ft)  15' x 2   Assistance Close supervision;Minimal verbal cues   Assistive Device Rolling walker   Verbal Cues Attention to task;Device advancement;Safety;Sequencing   Quality of Gait/Comment slow gait, steady w/ device, seems guarded d/t post-op pain   Pattern Decreased pace;Decreased step length   Endurance Deficit   Endurance Deficit Yes   Endurance Deficit Description fatigue   Balance   Sitting Balance Independent   Standing Balance  Standby;Supported   Plan   Treatment/Interventions Functional transfer training;Gait/stair training;Strengthening/ROM;Neuromuscular re-ed   PT Frequency Daily   Assessment   Prognosis Excellent   Problem List Decreased mobility;Decreased endurance;Decreased strength;Impaired balance   Barriers to Discharge None   Recommendation   PT Discharge Recommendation Home with assist   PT Equipment Recommendation Rolling walker / FWW  (pending progress)   Treatment Suggestions for Next Session progress with bed mobility(log-roll), safe transfers, gait as able   PT Care Plan REVIEWED DAILY Yes, goals remain appropriate

## 2021-06-02 NOTE — ANESTHESIA POSTPROCEDURE EVALUATION
Patient: Amish Chua  CHOLECYSTECTOMY, LAPAROSCOPIC WITH INTRA-OP CHOLANGIOGRAMS  Anesthesia type: general    Patient location: PACU  Last vitals:   Vitals Value Taken Time   /75 10/5/2019 12:30 PM   Temp 37  C (98.6  F) 10/5/2019 12:15 PM   Pulse 100 10/5/2019 12:47 PM   Resp 20 10/5/2019 12:30 PM   SpO2 98 % 10/5/2019 12:30 PM   Vitals shown include unvalidated device data.  Post vital signs: stable  Level of consciousness: awake and responds to simple questions  Post-anesthesia pain: pain controlled  Post-anesthesia nausea and vomiting: no  Pulmonary: unassisted, nasal cannula  Cardiovascular: stable and blood pressure at baseline  Hydration: adequate  Anesthetic events: no    QCDR Measures:  ASA# 11 - Aishwarya-op Cardiac Arrest: ASA11B - Patient did NOT experience unanticipated cardiac arrest  ASA# 12 - Aishwarya-op Mortality Rate: ASA12B - Patient did NOT die  ASA# 13 - PACU Re-Intubation Rate: ASA13B - Patient did NOT require a new airway mgmt  ASA# 10 - Composite Anes Safety: ASA10A - No serious adverse event    Additional Notes:

## 2021-06-02 NOTE — PLAN OF CARE
Problem: Pain  Goal: Patient's pain/discomfort is manageable  Outcome: Progressing   Patient reports vicodin is adequate for abdominal pain. Administered x 1, will continue to monitor and treat as needed.       Problem: Potential for Compromised Skin Integrity  Goal: Nutritional status is improving  Outcome: Progressing   Tolerated regular diet this evening.

## 2021-06-02 NOTE — PROGRESS NOTES
"General Surgery Progress Note    1 Day Post-Op    CHOLECYSTECTOMY, LAPAROSCOPIC WITH INTRA-OP CHOLANGIOGRAMS    Subjective:   Pt is feeling some abdominal pressure and pain. He indicates pain at the incision sites and generalized pain that is waxing and waning in his lower abdomen and across the midsection. Patient states that he needs to \"poop\". He states his breathing is better since the BiPap was placed. Patient denies fever, chills, nausea, vomiting, CP, and leg pain.    Vitals:    10/06/19 0630 10/06/19 0645 10/06/19 0700 10/06/19 0715   BP: (!) 88/55 93/58 93/61 91/57   Patient Position:       Pulse: 81 84 86 82   Resp: 15 17 17 19   Temp:       TempSrc:       SpO2: 98% 97% 98% 99%   Weight:       Height:           Physical Exam:  General: NAD  Ab:       Soft, mildly distended, expected ttp POD1; The wound/ dressings are clean, dry, and intact  :      Patient is voiding adequate amount    Results from last 7 days   Lab Units 10/06/19  0507   LN-WHITE BLOOD CELL COUNT thou/uL 5.2   LN-HEMOGLOBIN g/dL 10.0*   LN-HEMATOCRIT % 29.9*   LN-PLATELET COUNT thou/uL 95*       Results from last 7 days   Lab Units 10/06/19  0507   LN-SODIUM mmol/L 138   LN-POTASSIUM mmol/L 3.9   LN-CHLORIDE mmol/L 107   LN-CO2 mmol/L 24   LN-BLOOD UREA NITROGEN mg/dL 15   LN-CREATININE mg/dL 0.96   LN-CALCIUM mg/dL 7.6*   LN-ALBUMIN g/dL 2.4*   LN-PROTEIN TOTAL g/dL 5.1*   LN-BILIRUBIN TOTAL mg/dL 0.5   LN-ALKALINE PHOSPHATASE U/L 81   LN-ALT (SGPT) U/L 58*   LN-AST (SGOT) U/L 75*       Assessment: Patient admitted with severe sepsis with suspicion of cholecystitis now status post laparoscopic cholecystectomy with negative cholangiograms.  Patient was hypotensive, tachycardic, and hypoxic after surgery and sent to the ICU.  He appears to be doing better this morning and from a purely surgical standpoint he is doing well.  Generalized abdominal pain could likely be bowels waking up in gas, however with the source of his infection " remaining elusive we should keep a close eye on this.  Gallbladder was not acutely inflamed and there was no signs of ascending cholangitis or biliary obstruction.  With patient not having any nausea or vomiting postoperatively and fevers under control, surgery is okay with starting clear liquid diet advancing as tolerated.  This may be contingent on his respiratory status and the need for BiPAP, so we will defer to the ICU team on what it is safe to start oral intake.    Plan:   Clear liquids and diet advancement as tolerated okay per surgery; defer to ICU team given respiratory status  [place] [facility] [place]  When able to ambulate, ambulate as tolerated    Tammy Neil, CNP  [phone]  [facility]

## 2021-06-02 NOTE — PROGRESS NOTES
Hospitalist Progress Note     Assessment/Plan:     Amish Chua 63-year-old male medical history of,, chronic who presented with right upper quadrant abdominal pain, fever, and  significant leukopenia concerning for severe sepsis.  MRCP was performed which showed dilated CBD with inability to rule out ampullary stricture on MRCP.  Due to suspicion for probable cholecystitis he underwent laparoscopic cholecystectomy on 10/5/2019.  Postoperatively, he developed severe abdominal pain, tachycardia, and respiratory distress.  CT abdomen/pelvis did not show any postoperative complications, perforation, or gas.  Lactic acid was 8 and the patient had recurrent fever to 103.  Antibiotic coverage was broadened from initial Zosyn to include vancomycin.  He was subsequently transferred to the ICU for close monitoring of his respiratory status.  He subsequently recovered without any infectious source identified.  Currently doing well with return of bowel function to normal and able to tolerate p.o. intake.  Antibiotics D escalated to IV Zosyn with plan to discontinue if repeat procalcitonin is negative.    Active Problem:   Possible sepsis secondary to cholecystitis:  Leukopenia with mild neutropenia, resolved:  Thrombocytopenia likely due to underlying liver disease:  Chronic hepatitis B:  Coagulopathy of liver disease:  Acute hypoxic failure, resolved:  -Seems like patient's initial presentation is consistent with infectious etiology possibly from chronic cholecystitis but clinical picture is not fully convincing.  Dilated CBD on MRCP but ALP/T bili are unremarkable.  Clinical picture not consistent with cholangitis either.  Chest x-ray with minimal pulmonary vascular congestion but no pneumonia.  Repeat CT abdomen pelvis during rapid response for severe abdominal pain just with postoperative changes.  UA unremarkable.  Symptoms of high fever, body aches, tachycardia, and lactic acidosis have now resolved.  Although,  procalcitonin is a 28 with no ongoing other signs of infection.  On broad-spectrum antibiotics with blood culture results remaining negative to date..  -No significant bleeding despite low platelets and elevated INR.  Received vitamin K and platelets preop/perioperatively.    Plan:  -Continue Zosyn, plan for 5-day course per antibiotic stewardship but procalcitonin is high from 10/7.  Repeat procalcitonin today not drawn, will reenter.  Consult ID for further recommendations if procalcitonin does not normalize..  -General surgery involved.  Okay to discharge from surgical standpoint.  -Check INR tomorrow morning.    Vitamin D deficiency:  -Level low here.  Started 10,000 IU daily x30 days.  Recheck lab in 1 to 2 months.    Dyslipidemia:  -Discontinued statin due to liver disease.    Type 2 diabetes mellitus::  -Holding glipizide and metformin.  Intermittently hypoglycemic.  Continue sliding scale insulin.    DVT prophylaxis: SCDs.    Subjective:     No acute events overnight.  Remains on room air with normal bowel function and tolerating adequate p.o. intake.  Abdominal pain much better controlled.  Patient reports muscular back pain.  Hemodynamically stable.  Discussed with the patient and his wife.  Family would like disability benefit form filled out and leave approved for the next 2 to 3 months.  Notify the family that patient can take 1 to 2 weeks of to recover but that I cannot sign a disability form for simple gallbladder surgery.    Review of systems:     Please see Subjective.    Current Medications:     Scheduled Meds:    cholecalciferol (vitamin D3)  10,000 Units Oral DAILY     heparin (PF)  5,000 Units Subcutaneous Q8H FIXED TIMES     influenza vaccine (age 50-64 YR or egg allergy) FLUBLOK quad (PF) inj 2019-20  0.5 mL Intramuscular Prior to Discharge     insulin aspart (NovoLOG) injection   Subcutaneous TID with meals     piperacillin-tazobactam  3.375 g Intravenous Q8H     sodium chloride  10-30 mL  Intravenous Q8H FIXED TIMES     Continuous Infusions:    PRN Meds:.acetaminophen, dextrose 50 % (D50W), glucagon (human recombinant), HYDROcodone-acetaminophen, HYDROmorphone, naloxone **OR** naloxone, sodium chloride bacteriostatic, sodium chloride bacteriostatic, sodium chloride bacteriostatic, sodium chloride, sodium chloride, sodium chloride    Objective:       Vital signs in last 24 hours:     Temp:  [98.1  F (36.7  C)-99.3  F (37.4  C)] 98.6  F (37  C)  Heart Rate:  [67-89] 68  Resp:  [16-20] 16  BP: ()/(52-75) 130/75  Weight: 116 lb (52.6 kg)    Physical Exam:     General appearance: Alert, Awake, No distress   Head & Neck Atraumatic, supple, no tracheal deviation   ENT  PER, conjunctiva clear, no scleral icterus, EOMI, no nasal discharge   Resp:  Breathing labored, tachypnea, clear to auscultation bilaterally, no wheezing, no crackles,   CVS: S1, S2 normal, regular rate and regular rhythm, no murmurs   GI: Soft, nontender, ND and BS+   MSK: No swelling, or tenderness   Neuro:  Alert and oriented ×3     Intake/Output this shift:     I/O last 3 completed shifts:  In: 240 [P.O.:240]  Out: -     Pertinent Labs:     Lab Results: personally reviewed.     Pertinent Radiology:       Advanced Care Planning:   More than 35 minutes were spent with the patient and family with more than 50% of the time spent on counseling and coordination of care.     Barrier to discharge: High procalcitonin.  Anticipated LOS: Tomorrow if procalcitonin normalizes.  Disposition: To home with family support    Lora Cid M.D.  [facility]  [facility]

## 2021-06-02 NOTE — PROGRESS NOTES
Completed and  Signed [facility] [phone] @ 4114.       Sent to HIM @4612    Lexii Kaiser LPN  [facility]  General Surgery  P: [phone]  F: [phone]

## 2021-06-02 NOTE — ED TRIAGE NOTES
Pt brought in by SPF coming from clinic. Pt found to by hypotensive at 84/50. Pt's wife is translating for patient. Pt's wife states he has had fever, nausea and vomiting. BS was reported at 122.

## 2021-06-02 NOTE — PROGRESS NOTES
Called and spoke with pt's wife. Informed her of results. She had no further questions or concerns.

## 2021-06-02 NOTE — PROGRESS NOTES
General Surgery Progress Note    3 Days Post-Op    CHOLECYSTECTOMY, LAPAROSCOPIC WITH INTRA-OP CHOLANGIOGRAMS    Subjective:   Pt is feeling better.  Patient reports no pain.  He is tolerating a regular diet, passing flatus, voiding, and has had multiple bowel movements since surgery. patient denies fever, chills, nausea, vomiting, SOB, CP, and leg pain.    Vitals:    10/07/19 1523 10/07/19 1927 10/07/19 2324 10/08/19 0729   BP: 114/63 113/61 99/62 104/52   Patient Position: Semi-glez Semi-glez Lying Semi-glez   Pulse: 84 84 89 67   Resp: 16 16 20 18   Temp: 99.6  F (37.6  C) 99.3  F (37.4  C) 99.3  F (37.4  C) 98.1  F (36.7  C)   TempSrc: Oral Oral Oral Oral   SpO2: 93% 93% 93% 93%   Weight:       Height:           Physical Exam:  General: NAD  Ab:       Soft, not distended, nontender; The wound/ dressings are clean, dry, and intact  :      Patient is voiding adequate amount    Results from last 7 days   Lab Units 10/07/19  0504   LN-WHITE BLOOD CELL COUNT thou/uL 4.5   LN-HEMOGLOBIN g/dL 9.5*   LN-HEMATOCRIT % 28.4*   LN-PLATELET COUNT thou/uL 87*       Results from last 7 days   Lab Units 10/07/19  0504   LN-SODIUM mmol/L 137   LN-POTASSIUM mmol/L 3.5   LN-CHLORIDE mmol/L 107   LN-CO2 mmol/L 22   LN-BLOOD UREA NITROGEN mg/dL 15   LN-CREATININE mg/dL 0.84   LN-CALCIUM mg/dL 7.4*   LN-ALBUMIN g/dL 2.3*   LN-PROTEIN TOTAL g/dL 4.8*   LN-BILIRUBIN TOTAL mg/dL 0.5   LN-ALKALINE PHOSPHATASE U/L 81   LN-ALT (SGPT) U/L 55*   LN-AST (SGOT) U/L 67*       Assessment:  Patient admitted with severe sepsis with suspicion of cholecystitis now status post laparoscopic cholecystectomy with negative cholangiograms.  Doing well from a surgical standpoint.    Plan:   Medical management per [facility]  Surgical discharge recommendations/instructions placed  Surgery will sign off; please contact us with any questions or concerns    Tammy Neil CNP  [phone]  [facility] Surgery

## 2021-06-02 NOTE — PROGRESS NOTES
Hospitalist Progress Note     Assessment/Plan:     Amish Chua 63-year-old male medical history of,, chronic who presented with right upper quadrant abdominal pain, fever, and  significant leukopenia concerning for severe sepsis.  MRCP was performed which showed dilated CBD with inability to rule out ampullary stricture on MRCP.  Due to suspicion for probable cholecystitis he underwent laparoscopic cholecystectomy on 10/5/2019.  Postoperatively, he developed severe abdominal pain, tachycardia, and respiratory distress.  CT abdomen/pelvis did not show any postoperative complications, perforation, or gas.  Lactic acid was 8 and the patient had recurrent fever to 103.  Antibiotic coverage was broadened from initial Zosyn to include vancomycin.  He was subsequently transferred to the ICU for close monitoring of his respiratory status.    Active Problem:   Possible sepsis secondary to cholecystitis:  Leukopenia with mild neutropenia:  Thrombocytopenia likely due to underlying liver disease:  Chronic hepatitis B:  Coagulopathy of liver disease:  Acute hypoxic failure:  -Seems like patient's presentation is consistent with infectious etiology chronic cholecystitis but clinical picture is not fully convincing.  Dilated CBD on MRCP but ALP/T bili are unremarkable.  Clinical picture not consistent with cholangitis.  Chest x-ray with minimal pulmonary vascular congestion.  -No significant bleeding despite low platelets and elevated INR.  Received vitamin K and platelets preop/perioperatively    Plan:  -Continue Zosyn.  Add vancomycin.  Follow-up on blood culture results.  Check UA.  Monitor on BiPAP.  Discussed with intensivist regarding rapid response today, patient's clinical situation, respiratory status, potential diagnoses.  Discussed extensively with patient's family members.  -General surgery involved.    Dyslipidemia:  -Discontinue statin due to liver disease.    Type 2 diabetes mellitus::  -Hold glipizide and  metformin.  Intermittently hypoglycemic.  Continue sliding scale insulin.    DVT prophylaxis: SCDs.    Subjective:     No acute events overnight.  Patient underwent laparoscopic  cholecystectomy and was stable in the PACU.  Upon transfer to the floor he developed severe 10/10 generalized abdominal pain, tremor, shivering, and acute hypoxic failure requiring BiPAP.  Rapid response was called, patient subsequently transferred to the ICU.    Review of systems:     Please see Subjective.    Current Medications:     Scheduled Meds:    [START ON 10/7/2019] influenza vaccine (age 50-64 YR or egg allergy) FLUBLOK quad (PF) inj 2019-20  0.5 mL Intramuscular Prior to Discharge     insulin aspart (NovoLOG) injection   Subcutaneous TID with meals     ipratropium-albuterol         piperacillin-tazobactam  3.375 g Intravenous Q8H     sodium bicarbonate  50 mEq Intravenous Once     sodium chloride  3 mL Intravenous Line Care     vancomycin  750 mg Intravenous Q18H     Continuous Infusions:    lactated ringers 125 mL/hr (10/05/19 0303)     PRN Meds:.acetaminophen, dextrose 50 % (D50W), glucagon (human recombinant), HYDROcodone-acetaminophen, HYDROmorphone, lidocaine (PF), naloxone **OR** naloxone, sodium chloride bacteriostatic, sodium chloride bacteriostatic, sodium chloride bacteriostatic    Objective:       Vital signs in last 24 hours:     Temp:  [97.8  F (36.6  C)-103.2  F (39.6  C)] 103.1  F (39.5  C)  Heart Rate:  [] 126  Resp:  [16-40] 20  BP: ()/(53-99) 105/63  FiO2 (%):  [70 %] 70 %  Weight: 135 lb 8 oz (61.5 kg)    Physical Exam:     General appearance: Alert, Awake, No distress   Head & Neck Atraumatic, supple, no tracheal deviation   ENT  PER, conjunctiva clear, no scleral icterus, EOMI, no nasal discharge   Resp:  Breathing labored, tachypnea, clear to auscultation bilaterally, no wheezing, no crackles,   CVS: S1, S2 normal,  tachycardic rate and regular rhythm, no murmurs   GI: Soft, diffusely tender, ND  and BS+   MSK: No swelling, or tenderness   Neuro:  Alert and oriented ×3     Intake/Output this shift:     I/O last 3 completed shifts:  In: 2050 [I.V.:1650; Blood:400]  Out: 450 [Urine:400; Blood:50]    Pertinent Labs:     Lab Results: personally reviewed.     Pertinent Radiology:       Advanced Care Planning:   More than 35 minutes were spent with the patient and family with more than 50% of the time spent on counseling and coordination of care.     Separate 36 minutes were spent with the patient running rapid response, discussion with the patient's family, nursing staff, respiratory therapy.  Patient required stat labs, stat imaging.  Discussed with intensivist regarding patient's clinical situation, clinical history, work-up and treatment pursued.      Barrier to discharge: Acute hypoxic respiratory, possible sepsis  Anticipated LOS: To be determined  Disposition: To be determined    Lora Cid M.D.  [facility] Hospitalist  Internal Medicine

## 2021-06-02 NOTE — PLAN OF CARE
Problem: Pain  Goal: Patient's pain/discomfort is manageable  Outcome: Progressing     Problem: Daily Care  Goal: Daily care needs are met  Outcome: Progressing   Pain controlled with medication, tylenol for sore back. Was requesting suctioning at start of shift. Given incentive spirometer. Was able to reach 800. Discussed importance of coughing and deep breaths.

## 2021-06-02 NOTE — PROGRESS NOTES
"HPI: Pt is here for follow up of a lap cholecystectomy with cholangiogram.  Patient had a complicated course of stay in the hospital.  He did undergo a cholecystectomy which he had a normal gallbladder as well as a cholangiogram which showed some narrowing at the distal common bile duct.  He did develop sepsis and did need ICU cares.  He was discharged on the 10th.  Since being home reports severe fatigue.  Wife states that he is not able to care for himself.  She has to help bathe, feed and help him to the restroom.  Otherwise he is not very mobile at home and not moving around.  At the hospital he was also weak but did use a walker.  Patient presented today with an  as well as a wife being able to speak English.  Has not been able to eat and is only drinking shakes at home.  Does get hungry at times.  Feels like things get stuck in his stomach and then he wants to throw up.  He occasionally does throw up.  He is having small regular bowel movements.  No diarrhea, fever.  Is having a lot of heartburn.  In the past he did not have any heartburn.  Wife states that he used to be able to get around and work without any issues prior to getting ill.  BP 96/62 (Patient Site: Left Arm, Patient Position: Sitting, Cuff Size: Adult Regular)   Pulse 83   Ht 4' 8\" (1.422 m)   Wt (!) 98 lb (44.5 kg)   SpO2 95%   BMI 21.97 kg/m      EXAM:  GENERAL:Appears well  ABDOMEN:  Soft, +BS  SURGICAL WOUNDS:  Incisions healing well, no enduration or drainage.      GALLBLADDER, CHOLECYSTECTOMY:     -  MIXED ACUTE AND CHRONIC CHOLECYSTITIS WITH CHOLELITHIASIS  Assessment/Plan: .  Reviewed his hospital stay and laboratory test.  Recheck LFTs, lipase and CBC.  Only abnormality seen was a slight elevation of lipase of 303.  HIDA scan was ordered to rule out any issues with the sphincter.  Also could be a slight possibility of some type of obstruction but it is not clear.  Because his LFTs are normal I am less suspicious for an " obstruction but he did have an abnormal findings of not only the narrowing but I dilated common bile duct.  Start him on Reglan to see if this would help some of his symptoms.  I we will also send him for gastroenterology consultation.  He needs to follow-up with his primary.  I spent 30 minutes with the patient today educating on what findings I see on his imaging as well as laboratory test.  I also reviewed the fact that his symptoms are not from the gallbladder being removed or the surgery itself.  Min is completely benign and has good healing of his incisions I think there are other things that are contributing to his symptoms and it is not clear of why he is not working towards getting stronger.  I definitely think that his nutrition is extremely poor and not getting the proper nutrients.  I explained to the wife that I am only able to fill out paperwork relating to the surgery and I cannot continue to give him disability for his fatigue and abdominal symptoms as it is not due to the surgery.  Kat Zamora PA-C  [facility] Department of Surgery

## 2021-06-02 NOTE — PLAN OF CARE
Problem: Occupational Therapy  Goal: OT Goals  Description  Patient will demonstrate the following by 10-15-19, in order to maximize independence with ADL/IADL performance:    -Demonstrate safety with Bed/Chair/Toilet transfer with mod indep   -Complete total body dressing with mod indep, using appropriate adaptive equipment PRN   -Participate in grooming/hygiene tasks with mod indep standing at the sink.  Goals entered on 10/7/2019 by Bret Jeronimo OT     Outcome: Completed     Occupational Therapy Discharge Summary    Date of OT Discharge: 10/8/19  Refer to daily doc flowsheet for equipment issued and current functional status.  Discharge Destination: Remains in hospital  Discharge Comments: No further OT indicated. Defer to PT for mobility. Agree with plan to d/c home with 24 hour assist as needed from spouse.      10/8/2019 by Aide Leahy, OT

## 2021-06-02 NOTE — PLAN OF CARE
Problem: Pain  Goal: Patient's pain/discomfort is manageable  Outcome: Progressing   Patient has prn pain medication to manage his pain in the abd    Problem: Potential for Falls  Goal: Patient will remain free of falls  Outcome: Progressing   Patient knows how to use the call light, bed in low position with alarm on     Problem: Insufficient Fluid Volume  Goal: Fluid and electrolyte balance are achieved/maintained  Outcome: Progressing   Patient is on continuous IV fluid @ 125 mL/hr    Problem: Insufficient Nutritional Intake  Goal: Patient's nutritional intake is adequate  Outcome: Progressing   Patient is on antibiotic to help fight his infection

## 2021-06-02 NOTE — PROGRESS NOTES
Respiratory Care Note:    Patient was removed from the bipap at 0815. Tolerating well, no issues. Patient was on 2L nasal cannula with saturations at 100%. Patient is now on room air with saturations at 93%. No respiratory distress noted.     Shin Mc, LRT

## 2021-06-02 NOTE — PLAN OF CARE
Care Plan  Care Management       Care Management Goals of the Day: Confirm discharge plan.    Care Progression Reviewed With: Charge Nurse, Medical Doctor, Health Unit Coordinator, Nurse Care Manager     Barriers to Discharge: resolved    Discharge Disposition: home    Expected Discharge Date: 10/10/19    Transportation: family      Care Coordination Narrative: Patient's discharge plan remains the same for him to return home. No additional services added.

## 2021-06-02 NOTE — PROGRESS NOTES
[facility]  Paperwork and Physician's Disability Statement received from patient during appointment.   Patient has requested forms faxed to: [phone] once completed.    Forms are on [employer].     Ambrose Kaiser LPN.

## 2021-06-02 NOTE — PROGRESS NOTES
Patient had fevers again last night.  Continues to complain of pain in his right upper quadrant.  T-max 103, vital signs stable.    Physical exam:  Thin elderly gentleman in no acute distress  Abdomen is soft.  Mildly tender in the right upper quadrant but no significant guarding or rebound.    Laboratories:  Lab Results   Component Value Date    WBC 1.8 (LL) 10/05/2019    HGB 11.3 (L) 10/05/2019    HCT 33.4 (L) 10/05/2019    MCV 96 10/05/2019    PLT 56 (L) 10/05/2019     Lab Results   Component Value Date    INR 1.43 (H) 10/04/2019     Lab Results   Component Value Date    ALT 54 (H) 10/04/2019    AST 65 (H) 10/04/2019    ALKPHOS 91 10/04/2019    BILITOT 0.6 10/04/2019     Impression: Probable cholecystitis.  I am however not completely convinced and he has definite risk for surgery.  Unfortunately we are unable to get a hepatobiliary scan in this hospital.  He is thrombocytopenic and his INR is somewhat elevated so I think he has some underlying liver disease.  However there is no other source for his fever on ultrasound or CT scan.  I would expect his bilirubin to be elevated if this was a sending cholangitis although he is somewhat asked like that with the high fever he has.  Discussed the risk and benefits of surgery with him and his family.  At this point I feel were being pushed into doing something.  Again he is at significant risk for bleeding.

## 2021-06-02 NOTE — TELEPHONE ENCOUNTER
Called wife and let her know that so far laboratory tests are really within normal limits.   lipase is slightly elevated which is a pancreas test however it is not a very significant number.  It is possible that he has a mild pancreatitis which is causing some of his symptoms.  I still think that he should go ahead and get the HIDA scan and I will review those results when I see them.  After the HIDA scan is done I will let them know the results when I see them.  I do believe that he needs to see [facility] for a consultation and I will place the order.  Patient getting contact with them to schedule an appointment.  I reviewed his case to Dr. You and she tells me that his gallbladder was very normal and not the cause of his sepsis and the why he became so ill.  I also believe that his fatigue and symptoms are not because of the gallbladder being removed.  At this point there is nothing for general surgery  Plan is to refer to [facility] for ongoing symptoms and to follow-up with primary care.  Okay to do weeks off after surgery for FMLA.  Filled out her paperwork.

## 2021-06-02 NOTE — PROGRESS NOTES
Respiratory Care Note    Pt continues to be on bipap 12/6 16 24%, sats 98% and tolerating well. Will attempt break as tolerates. Breath sounds coarse on R, exp.wheezes on L. Appears to be comfortable resting in bed. RR 16-18, -450ml. ABG sent at 1432 results 7.40/35/74/22.8/100% BE -2.7. MD aware. RT following.    Michi Waller LRT

## 2021-06-02 NOTE — PLAN OF CARE
Problem: Discharge Barriers  Goal: Patient s discharge needs will be met  Outcome: Progressing    Care Plan-Care Management     Care Management Goals of the Day:   Initial assessment and discharge planning     Care Progression to be reviewed with MD and RN CM: Updates to be provided to Charge RN, HUC,therapy and other disciplines as indicated.     Barriers to Discharge and plan: Post surgical progression,  IV medication, toleration of diet (advancing as able)     Discharge Disposition:   home    Expected Discharge Date:  10/8/2019     Transportation:  Wife Shruthi to transport at d/c     Care Management/Coordination Narrative:   Introduction to care management role and discharge planning. Pt is Hmong speaking but understands and speaks some English. Wife does speak English, able to provide assessment info. Pt lives in house with wife and 1 adult son. He is independent at baseline with ADLs. [place] Expects to be d/c to home. Wife will transport at d/c. Denies any need for services. CM to follow        Abbreviation  Code:  [facility] ([facility]), [facility] ([facility]), Patient (pt), Transitional Care Unit (TCU), Skilled Nursing Facility (SNF), Long Term Care (LTC), Assisted Living (skilled nursing or AL), Care Management (CM), Physical Therapy (PT), Occupational Therapy (OT), Speech Therapy (ST), Respiratory Therapy (RT).

## 2021-06-02 NOTE — PROGRESS NOTES
Respiratory Care Note    Pt transported from 3500 to CT. From CT to ICU. Pt remained on bipap throughout transport. No issues. Settings: 12/6 16 100%. Unable to obtain sat earlier due to tremors. Will continue to monitor closely and titrate as able. RT following.    LANE FloresT

## 2021-06-02 NOTE — PROGRESS NOTES
Hospitalist Progress Note  Brief history:  63-year-old male with h/o DM2, chronic hep B, and dyslipidemia who presented with right upper quadrant abdominal pain, fever, and  significant leukopenia concerning for severe sepsis.  MRCP was performed which showed dilated gallbladder with gallstones and dilated CBD. Due to suspicion for probable cholecystitis patient underwent laparoscopic cholecystectomy on 10/5/2019.  Postoperatively, he developed severe abdominal pain, tachycardia, and respiratory distress.  CT abdomen/pelvis did not show any postoperative complications, perforation, or gas.  Lactic acid was 8 and the patient had recurrent fever to 103.  Antibiotic coverage was broadened from initial Zosyn to include vancomycin.  He was subsequently transferred to the ICU for close monitoring of his respiratory status.    Patient subsequently recovered without any new infectious source identified (pathology demonstrated  mixed acute and chronic cholecystitis with cholelithiasis).  Patient is currently doing well with return of bowel function to normal and able to tolerate p.o. intake.  Antibiotics were de escalated to IV Zosyn which will be completed today.     Assessment and plan  1.  Severe sepsis on admission likely secondary to acute cholecystitis.  Status post laparoscopic cholecystectomy on 10/5/2019.  Patient is doing well postoperatively with return of normal bowel function.  Continue diet as tolerated.  Increase activity.  2.  Postoperative pain, recurrent fever, lactic acidosis and elevated procalcitonin.  No clear etiology was found.  Clinically patient is improving and procalcitonin is trending down.  Will complete 5 days of IV antibiotics.  3.  Vitamin D deficiency.  Continue supplementation  4.  Dyslipidemia.  Holding statin due to abnormal LFTs  5.  DM 2 without long-term insulin use.  Blood sugars are within normal limits.  Continue to hold oral antidiabetic medications      Barriers to Discharge : IV  antibiotics  Anticipated Discharge : Tomorrow  Disposition : Home        Subjective  History was obtained with the help of professional [facility]   Patient reports minimal incisional pain.  No nausea.  He ate most of his breakfast.  He had a small BM this morning.  He is still very weak and ambulates with a walker (was completely independent and working until the day of admission).  He is very sleepy at this time since he usually works night shifts and sleeps until noon.      Objective    Vital signs in last 24 hours  Temp:  [98.2  F (36.8  C)-98.7  F (37.1  C)] 98.3  F (36.8  C)  Heart Rate:  [60-82] 82  Resp:  [16-18] 16  BP: ()/(49-75) 120/60 92% O2 Device: None (Room air) O2 Flow Rate (L/min): 2 L/min  Weight:   116 lb (52.6 kg) Weight change:     Intake/Output last 3 shifts  I/O last 3 completed shifts:  In: 360 [P.O.:360]  Out: -   Intake/Output this shift:  I/O this shift:  In: 150 [P.O.:150]  Out: -     Physical Exam:  GENERAL: No acute distress, sleepy  HEENT: Moist mucous membranes  CARDIAC: Regular rate & rhythm, S1 & S2 normal.  No gallops or murmurs. No edema  LUNGS: Clear to auscultation bilaterally  ABDOMEN: Soft, mild RUQ tenderness, incision CDI, bowel sounds present all quadrants  NEURO: Grossly intact      Pertinent Labs   Lab Results: personally reviewed.   Results from last 7 days   Lab Units 10/07/19  0504 10/06/19  0507 10/05/19  1352   LN-SODIUM mmol/L 137 138 137   LN-POTASSIUM mmol/L 3.5 3.9 4.6   LN-CHLORIDE mmol/L 107 107 104   LN-CO2 mmol/L 22 24 19*   LN-BLOOD UREA NITROGEN mg/dL 15 15 13   LN-CREATININE mg/dL 0.84 0.96 1.12   LN-CALCIUM mg/dL 7.4* 7.6* 8.2*   LN-ALBUMIN g/dL 2.3* 2.4* 3.1*   LN-PROTEIN TOTAL g/dL 4.8* 5.1* 6.5   LN-BILIRUBIN TOTAL mg/dL 0.5 0.5 0.7   LN-ALKALINE PHOSPHATASE U/L 81 81 116   LN-ALT (SGPT) U/L 55* 58* 72*   LN-AST (SGOT) U/L 67* 75* 90*   LN-MAGNESIUM mg/dL 1.9 1.5*  --      Results from last 7 days   Lab Units 10/07/19  0504 10/06/19  0506  10/05/19  1352  10/04/19  1006   LN-WHITE BLOOD CELL COUNT thou/uL 4.5 5.2 3.0*   < > 3.0*   LN-HEMOGLOBIN g/dL 9.5* 10.0* 12.7*   < > 14.1   LN-HEMATOCRIT % 28.4* 29.9* 40.3   < > 42.1   LN-PLATELET COUNT thou/uL 87* 95* 147   < > 90*   LN-NEUTROPHILS RELATIVE PERCENT % 68  --   --   --  72*   LN-MONOCYTES RELATIVE PERCENT % 4  --   --   --  3   LN-MONOCYTES - REL (DIFF) %  --  1* 5   < >  --     < > = values in this interval not displayed.     Results from last 7 days   Lab Units 10/05/19  1550 10/05/19  1352   LN-CREATINE KINASE TOTAL U/L 207* 169   LN-TROPONIN I ng/mL  --  <0.01       Medications  Scheduled Meds:    cholecalciferol (vitamin D3)  10,000 Units Oral DAILY     heparin (PF)  5,000 Units Subcutaneous Q8H FIXED TIMES     influenza vaccine (age 50-64 YR or egg allergy) FLUBLOK quad (PF) inj 2019-20  0.5 mL Intramuscular Prior to Discharge     insulin aspart (NovoLOG) injection   Subcutaneous TID with meals     piperacillin-tazobactam  3.375 g Intravenous Q8H     sodium chloride  10-30 mL Intravenous Q8H FIXED TIMES     Continuous Infusions:  PRN Meds:.acetaminophen, dextrose 50 % (D50W), glucagon (human recombinant), HYDROcodone-acetaminophen, HYDROmorphone, naloxone **OR** naloxone, oxyCODONE, sodium chloride bacteriostatic, sodium chloride bacteriostatic, sodium chloride bacteriostatic, sodium chloride, sodium chloride, sodium chloride    Pertinent Radiology   Radiology Results: Personally reviewed image/s  EXAM: CT ABDOMEN PELVIS WO ORAL W IV CONTRAST  DATE/TIME: 10/2/2019 9:43 PM  1.  No appendicitis.  2.  Calcified gallstones. Mild dilatation of the common bile duct. A calcified stone is not seen within the duct by CT. The proximal common bile duct measures 9 mm.    EXAM: MR MRCP W WO CONTRAST  DATE/TIME: 10/4/2019 5:49 PM   MRCP: Distended gallbladder with gallstones. There are no filling defects demonstrated in the common bile duct. The common bile duct is mildly dilated at 0.9 cm. This could be  due to stricture formation at the ampulla.  LIVER: Right hepatic lobe 0.8 cm hypervascular nodule without any abnormality noted on the diffusion-weighted images likely shunting. The remainder of the liver is normal.  PANCREAS: No mass. No ductal dilatation.  ADDITIONAL FINDINGS: Adrenal glands, kidneys, and spleen normal.  IMPRESSION:   1.  Distended gallbladder with gallstones. Mild dilatation of the common bile duct without filling defects; ampullary stricture formation cannot be excluded.    Advanced Care Planning:  Treatment/Discharge Planning discussed with the patient and wife    Total time with this patient is 35 minutes with greater than 50% of time spent in discussing treatment plan with the patient and family    Unique Patton MD  Internal Medicine Hospitalist  10/9/2019
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
## 2021-06-02 NOTE — PLAN OF CARE
Problem: Physical Therapy  Goal: PT Goals  Description  Patient will demonstrate the following by 10/14/19, in order to maximize independence with functional mobility to facilitate safe discharge:   -Supine<>sit with head of bed flat, no rail, I, with log-roll  -Sit<>stand with LR/no assistive device, I  -Ambulate >300 feet with LR/no assistive device, Sup   -Negotiate 8 stairs with single rails, Sup, in order to access home.    Goals entered on 10/7/2019 by Henry Hawkins, PT      Outcome: Completed  Physical Therapy Discharge Summary    Date of PT Discharge: 10/10/19  Recommended Equipment: FWW  Discharge Destination: Home  Discharge Comments: D/C to home with family support, recommend FWW for safe ambulation.     Please note that if goals are not fully met the patient is making progress towards established goals, which is documented in flowsheet notes. If further therapy is recommended it is related to documented deficits, and is necessary to maximize functional independence in order for patient to return to previous level of function.      10/10/2019 by Sary Dickinson, PT

## 2021-06-02 NOTE — PLAN OF CARE
Problem: Pain  Goal: Patient's pain/discomfort is manageable  Outcome: Progressing   Pt states pain adequately controlled with current pain medications.  Essential oils offered prn.  Will continue to monitor.    Problem: Safety  Goal: Patient will be injury free during hospitalization  Outcome: Progressing   Pt uses call light appropriately.  Makes needs known.  Will continue to monitor.

## 2021-06-02 NOTE — ANESTHESIA CARE TRANSFER NOTE
Last vitals:   Vitals:    10/05/19 1137   BP: 122/74   Pulse: (!) 104   Resp: 24   Temp: 37.1  C (98.8  F)   SpO2: 97%     Patient's level of consciousness is drowsy  Spontaneous respirations: yes  Maintains airway independently: yes  Dentition unchanged: yes  Oropharynx: oropharynx clear of all foreign objects    QCDR Measures:  ASA# 20 - Surgical Safety Checklist: WHO surgical safety checklist completed prior to induction    PQRS# 430 - Adult PONV Prevention: 4558F - Pt received => 2 anti-emetic agents (different classes) preop & intraop  ASA# 8 - Peds PONV Prevention: NA - Not pediatric patient, not GA or 2 or more risk factors NOT present  PQRS# 424 - Aishwarya-op Temp Management: 4559F - At least one body temp DOCUMENTED => 35.5C or 95.9F within required timeframe  PQRS# 426 - PACU Transfer Protocol: - Transfer of care checklist used  ASA# 14 - Acute Post-op Pain: ASA14B - Patient did NOT experience pain >= 7 out of 10

## 2021-06-02 NOTE — ED PROVIDER NOTES
EMERGENCY DEPARTMENT ENCOUNTER      NAME: Amish Chua  AGE: 63 y.o. male  YOB: 1955  MRN: [id number]  EVALUATION DATE & TIME: 10/4/2019  9:55 AM    PCP: Whit Max MD    ED PROVIDER: YVONNE Burgess    Chief Complaint   Patient presents with     Hypotension     FINAL IMPRESSION:  1. Abdominal pain, right upper quadrant    2. Elevated LFTs         ED COURSE & MEDICAL DECISION MAKING:    Pertinent Labs & Imaging studies reviewed. (See chart for details)  63 y.o. male presents to the Emergency Department for evaluation of abdominal pain repetitive nausea vomiting fevers and general malaise.  Evaluated in this emergency department 2 days previous.  Had screening laboratory testing CT scan of the abdomen pelvis and right upper quadrant ultrasound.  Cholelithiasis was identified.  No other acute ab normalities to account for symptoms and he was discharged home with a diagnosis of viral syndrome.  Persistent symptoms since discharge.  Abdominal pain repetitive vomiting.  Went to clinic today in follow-up to the ED visit and was noted to be hypotensive and subsequently transported to the emergency department for evaluation.  On my initial examination he had borderline tachycardia but no hypotension.  He was afebrile.  He had focal right upper quadrant pain to palpation.  Clinical examination otherwise unremarkable.  He received a 30 cc/kg bolus out of concern for possible sepsis syndrome.  White blood cell count noted to be 3.0.  Lactic acid was negative.  He did have a small bump to his kidney function since his last evaluation and his liver function tests have trended upward slightly.  I repeated his ultrasound is my clinical concern was that this was cholecystitis based on the information he had so far in his current clinical exam.  The ultrasound demonstrating cholelithiasis without evidence of cholecystitis he does have a dilated common bili duct at 16 mm.  I do feel that given patient's persistent  symptoms and hypertension that he experienced in the clinic that admission to the hospital for further care management as indicated.  Case was discussed with Dr. Scott of the hospitalist service.  General surgery has been paged for discussion regarding the common bile duct and clinical concerns.  He is not reporting any current chest pain his ECG demonstrating a mild sinus tachycardia with a heart rate in the low 100s no ST segment changes to suggest acute ischemia.    12:47 PM  Discussed case with Dr. Magdaleno [facility] general surgery.  Recommendations were for n.p.o. status.  Empiric antibiotics.  General surgical consultation for probable laparoscopic cholecystectomy later today.    9:59 AM: The patient was interviewed and examined. History in the chart was reviewed.     I have instructed the patient to return to the ER at any time if there are any new or worsening symptoms. The patient expressed understanding of and agreement with this plan. Opportunity was given for questions prior to discharge and all stated questions were answered to the patient's satisfaction. Home care instructions provided.    MEDICATIONS GIVEN IN THE EMERGENCY:  Medications   sodium chloride 0.9% 1,000 mL (has no administration in time range)   sodium chloride 0.9% 1,497 mL (0 mL Intravenous Stopped 10/4/19 1137)   piperacillin-tazobactam 3.375 g in NaCl 0.9 % 50 mL (MINI-BAG Plus) (ZOSYN) (0 g Intravenous Stopped 10/4/19 1229)       NEW PRESCRIPTIONS STARTED AT TODAY'S ER VISIT  Current Discharge Medication List      CONTINUE these medications which have NOT CHANGED    Details   atorvastatin (LIPITOR) 40 MG tablet Take 40 mg by mouth at bedtime.      glipiZIDE (GLUCOTROL XL) 2.5 MG 24 hr tablet Take 2.5 mg by mouth daily with breakfast.      metFORMIN (GLUCOPHAGE-XR) 500 MG 24 hr tablet Take 1,000 mg by mouth 2 (two) times a day with meals.      ondansetron (ZOFRAN) 4 MG tablet Take 1 tablet (4 mg total) by mouth every 6 (six)  hours.  Qty: 12 tablet, Refills: 0    Associated Diagnoses: Viral syndrome                =================================================================    HPI    Patient information was obtained from: the patient and patient's wife.    Use of Intrepreter: Patient's wife helped to translate throughout the interview.     Amish Chua is a 63 y.o. male who presents to the ED with hypotension. The patient arrives to the ED via EMS from clinic after he was found to be hypotensive. Per [facility] Department, the patient was hypotensive at 84/50 this morning while being evaluated for recent nausea and vomiting. The patient's wife states that the patient has been experiencing worsening nausea, vomiting, abdominal pain, and fever for the past 4 days. She explains that the patient vomited 4 times yesterday and he has been unable to keep much food or fluids down. The patient endorses right-sided abdominal pain and right neck pain. He denies any shortness of breath, diarrhea, or dysuria. He has no history of abdominal surgeries and wife expresses that he was very healthy prior to symptom onset.       REVIEW OF SYSTEMS   Review of Systems   Constitutional: Positive for fever. Negative for chills.   HENT: Negative for congestion, ear pain, rhinorrhea and sore throat.    Respiratory: Negative for cough, chest tightness and shortness of breath.    Cardiovascular: Negative for chest pain and palpitations.   Gastrointestinal: Positive for abdominal pain ( right), nausea and vomiting. Negative for diarrhea.   Genitourinary: Negative for dysuria, frequency and hematuria.   Musculoskeletal: Positive for neck pain ( right). Negative for arthralgias and myalgias.   Neurological: Negative for dizziness, weakness, numbness and headaches.   All other systems reviewed and are negative.       All other symptoms reviewed and are negative unless noted in HPI.    PAST MEDICAL HISTORY:  No past medical history on file.    PAST SURGICAL  HISTORY:  No past surgical history on file.        CURRENT MEDICATIONS:    No current facility-administered medications on file prior to encounter.      Current Outpatient Medications on File Prior to Encounter   Medication Sig     atorvastatin (LIPITOR) 40 MG tablet Take 40 mg by mouth at bedtime.     glipiZIDE (GLUCOTROL XL) 2.5 MG 24 hr tablet Take 2.5 mg by mouth daily with breakfast.     metFORMIN (GLUCOPHAGE-XR) 500 MG 24 hr tablet Take 1,000 mg by mouth 2 (two) times a day with meals.     ondansetron (ZOFRAN) 4 MG tablet Take 1 tablet (4 mg total) by mouth every 6 (six) hours.       ALLERGIES:  No Known Allergies    FAMILY HISTORY:  No family history on file.    SOCIAL HISTORY:   Social History     Socioeconomic History     Marital status:      Spouse name: Not on file     Number of children: Not on file     Years of education: Not on file     Highest education level: Not on file   Occupational History     Not on file   Social Needs     Financial resource strain: Not on file     Food insecurity:     Worry: Not on file     Inability: Not on file     Transportation needs:     Medical: Not on file     Non-medical: Not on file   Tobacco Use     Smoking status: Not on file   Substance and Sexual Activity     Alcohol use: Not on file     Drug use: Not on file     Sexual activity: Not on file   Lifestyle     Physical activity:     Days per week: Not on file     Minutes per session: Not on file     Stress: Not on file   Relationships     Social connections:     Talks on phone: Not on file     Gets together: Not on file     Attends Voodoo service: Not on file     Active member of club or organization: Not on file     Attends meetings of clubs or organizations: Not on file     Relationship status: Not on file     Intimate partner violence:     Fear of current or ex partner: Not on file     Emotionally abused: Not on file     Physically abused: Not on file     Forced sexual activity: Not on file   Other Topics  "Concern     Not on file   Social History Narrative     Not on file       VITALS:  Patient Vitals for the past 24 hrs:   BP Temp Temp src Pulse Resp SpO2 Height Weight   10/04/19 1230 -- -- -- 96 15 98 % -- --   10/04/19 1200 -- -- -- 96 20 97 % -- --   10/04/19 1145 92/55 -- -- 94 17 96 % -- --   10/04/19 1045 103/59 -- -- (!) 101 19 98 % -- --   10/04/19 1030 103/59 -- -- (!) 108 -- 99 % -- --   10/04/19 1000 110/58 99.7  F (37.6  C) Oral (!) 110 18 -- 4' 8\" (1.422 m) 110 lb (49.9 kg)       PHYSICAL EXAM    Constitutional:   Elderly male, appears fatigued.  HENT:  Normocephalic, Atraumatic, Bilateral external ears normal, mucous memories dry.  Neck: Normal range of motion   Eyes: Conjunctiva normal, No discharge.   Respiratory:  Normal breath sounds, No respiratory distress, No wheezing.  No cough.  Cardiovascular: Tachycardic no murmur appreciated.  Chest wall non-tender.  GI:   Right upper quadrant pain to palpation, No masses, No flank tenderness.  No rebound or guarding.  : No CVA tenderness  Musculoskeletal: Full ROM.  No edema appreciated.  No cyanosis. Good ROM of major joints.  Integument:  Warm, Dry, No erythema, No rash.    Neurologic:  Alert & oriented.  No focal deficits appreciated.  Ambulatory.  Psychiatric:  Affect normal, Judgment normal, Mood normal.     LAB:  All pertinent labs reviewed and interpreted.  Results for orders placed or performed during the hospital encounter of 10/04/19   Comprehensive Metabolic Panel   Result Value Ref Range    Sodium 136 136 - 145 mmol/L    Potassium 4.0 3.5 - 5.0 mmol/L    Chloride 103 98 - 107 mmol/L    CO2 25 22 - 31 mmol/L    Anion Gap, Calculation 8 5 - 18 mmol/L    Glucose 138 (H) 70 - 125 mg/dL    BUN 16 8 - 22 mg/dL    Creatinine 1.40 (H) 0.70 - 1.30 mg/dL    GFR MDRD Af Amer >60 >60 mL/min/1.73m2    GFR MDRD Non Af Amer 52 (L) >60 mL/min/1.73m2    Bilirubin, Total 1.0 0.0 - 1.0 mg/dL    Calcium 9.1 8.5 - 10.5 mg/dL    Protein, Total 6.6 6.0 - 8.0 g/dL    " Albumin 3.3 (L) 3.5 - 5.0 g/dL    Alkaline Phosphatase 108 45 - 120 U/L    AST 84 (H) 0 - 40 U/L    ALT 63 (H) 0 - 45 U/L   Lactic Acid   Result Value Ref Range    Lactic Acid 1.6 0.5 - 2.2 mmol/L   Urinalysis-UC if Indicated   Result Value Ref Range    Color, UA Yellow Colorless, Yellow, Straw, Light Yellow    Clarity, UA Slightly Cloudy (!) Clear    Glucose, UA 30 mg/dL (!) Negative    Bilirubin, UA Small (!) Negative    Ketones, UA Negative Negative    Specific Gravity, UA 1.021 1.001 - 1.030    Blood, UA Negative Negative    pH, UA 6.0 4.5 - 8.0    Protein,  mg/dL (!) Negative mg/dL    Urobilinogen, UA >8.0 E.U./dL (!) <2.0 E.U./dL, 2.0 E.U./dL    Nitrite, UA Negative Negative    Leukocytes, UA Negative Negative    Bacteria, UA Few (!) None Seen hpf    RBC, UA 0-2 None Seen, 0-2 hpf    WBC, UA 0-5 None Seen, 0-5 hpf    Squam Epithel, UA 10-25 (!) None Seen, 0-5 lpf    Amorphous, UA Few (!) None Seen    Mucus, UA Few (!) None Seen lpf    Granular Casts, UA 5-10 (!) None Seen lpf   INR   Result Value Ref Range    INR 1.43 (H) 0.90 - 1.10   HM1 (CBC with Diff)   Result Value Ref Range    WBC 3.0 (L) 4.0 - 11.0 thou/uL    RBC 4.39 (L) 4.40 - 6.20 mill/uL    Hemoglobin 14.1 14.0 - 18.0 g/dL    Hematocrit 42.1 40.0 - 54.0 %    MCV 96 80 - 100 fL    MCH 32.1 27.0 - 34.0 pg    MCHC 33.5 32.0 - 36.0 g/dL    RDW 12.8 11.0 - 14.5 %    Platelets 90 (L) 140 - 440 thou/uL    MPV 10.4 8.5 - 12.5 fL    Neutrophils % 72 (H) 50 - 70 %    Lymphocytes % 22 20 - 40 %    Monocytes % 3 2 - 10 %    Eosinophils % 2 0 - 6 %    Basophils % 0 0 - 2 %    Neutrophils Absolute 2.2 2.0 - 7.7 thou/uL    Lymphocytes Absolute 0.7 (L) 0.8 - 4.4 thou/uL    Monocytes Absolute 0.1 0.0 - 0.9 thou/uL    Eosinophils Absolute 0.1 0.0 - 0.4 thou/uL    Basophils Absolute 0.0 0.0 - 0.2 thou/uL   Manual Differential   Result Value Ref Range    Dohle Bodies 1+ (!) Negative    Platelet Estimate Decreased (!) Normal    Ovalocytes 1+ (!) Negative     Polychromasia 1+ (!) Negative       RADIOLOGY:  Reviewed all pertinent imaging. Please see official radiology report.  Xr Chest 1 View Portable    Result Date: 10/4/2019  EXAM: XR CHEST 1 VIEW PORTABLE LOCATION: [facility] DATE/TIME: 10/4/2019 10:45 AM INDICATION: fever COMPARISON: 10/2/2019     Shallow inspiration. Trace amount of pleural fluid and/or thickening right lateral costophrenic sulcus. Lungs are clear. Heart size and pulmonary vascularity within normal limits. Mild calcified atheromatous plaque thoracic aorta.    Us Abdomen Limited    Result Date: 10/4/2019  EXAM: US ABDOMEN LIMITED LOCATION: [facility] DATE/TIME: 10/4/2019 11:20 AM INDICATION: RUQ pain, n/v, fever COMPARISON: 10/2/2019 TECHNIQUE: Limited abdominal ultrasound. FINDINGS: GALLBLADDER: A few mobile gallstones identified. No gallbladder wall thickening or pericholecystic fluid. Negative sonographic Espinosa's sign. BILE DUCTS: There is extrahepatic biliary dilatation. The common duct measures up to 16 mm. LIVER: Normal parenchyma with smooth contour. No focal mass. RIGHT KIDNEY: No hydronephrosis. PANCREAS: The visualized portions are normal. No ascites.     1.  Cholelithiasis. No gallbladder wall thickening or pericholecystic fluid. No sonographic Espinosa sign. 2.  There is extrahepatic bile duct dilatation. No stones identified in the common bile duct on ultrasound, though ERCP or MRCP would be more sensitive.      EKG:    Independently reviewed and interpreted by me  Performed at: 10 AM  Sinus tachycardia  Heart rate 108  Left axis deviation  ST segments per nonischemic  No ectopy  No previous ECG for comparison  Intervals otherwise unremarkable  Clinical impression: Sinus tachycardia no other acute changes appreciated.      I, Aubrie Pina, am serving as a scribe to document services personally performed by Dr. Burgess based on my observation and the provider's statements to me. I, Tung Burgess MD attest that   is acting in a scribe capacity, has observed my performance of the services and has documented them in accordance with my direction.    Tung Burgess M.D.  Emergency Medicine  [facility] EMERGENCY DEPARTMENT  [address] [address]  Dept: [phone]  Loc: [phone]       Tung Burgess MD  10/04/19 1247       Tung Burgess MD  10/04/19 1248

## 2021-06-02 NOTE — PROGRESS NOTES
"Pharmacy Consult: Vancomycin Dosing    Pharmacist consulted to dose vancomycin for Amish Chua, a 63 y.o. male.    Ordering provider:  Tana Dyson DO, critical care     Indication for vancomycin therapy: Sepsis    Goal Trough Range:  15-20 mcg/mL based on indication    Other current antimicrobials              vancomycin 750 mg in sodium chloride 0.9% 250 mL (VANCOCIN)  Every 18 hours          piperacillin-tazobactam 3.375 g in NaCl 0.9 % 50 mL (MINI-BAG Plus) (ZOSYN)  Every 8 hours                   Subjective/Objective:    Patient was admitted for Severe sepsis (H) on 10/4/2019    Height: 4' 8\" (1.422 m)    Actual Body Weight (ABW): 61.5 kg (135 lb 8 oz)    Patient must be at least 60 in tall to calculate ideal body weight    BMI: Body mass index is 30.38 kg/m .    No Known Allergies    Patient Active Problem List   Diagnosis     Abdominal pain     Dyslipidemia     Diabetes mellitus, type 2 (H)     LUTHER (acute kidney injury) (H)     Severe sepsis (H)     Ascending cholangitis     Abdominal pain, right upper quadrant    Past Medical History:   Diagnosis Date     Chronic hepatitis B (H)      Diabetes mellitus, type 2 (H)      Dyslipidemia         Temp Readings from Current Encounter:     10/05/19 1255 10/05/19 1325 10/05/19 1415   Temp: 98.3  F (36.8  C) 99.1  F (37.3  C) (!) 103.1  F (39.5  C)     Net Intake/Output (last 24 hours):  I/O last 3 completed shifts:  In: 750 [I.V.:750]  Out: 400 [Urine:400]    Recent Labs     10/02/19  2035 10/04/19  1006 10/04/19  1657 10/04/19  2021 10/05/19  0653 10/05/19  1352 10/05/19  1356   WBC 4.3 3.0*  --  1.8* 1.8* 3.0*  --    NEUTROABS  --  2.2  --   --   --   --   --    LACTICACID 1.1 1.6 2.6* 1.0  --   --  8.0*   BUN 14 16  --  14 14 13  --    CREATININE 1.11 1.40*  --  1.13 1.02 1.12  --      Estimated Creatinine Clearance: 60.2 mL/min (by C-G formula based on SCr of 1.12 mg/dL).    No results for input(s): CULTURE in the last 72 hours.    No results found for any " visits on 10/04/19.    No results for input(s): VANCOMYCIN in the last 168 hours.    Vancomycin administrations: (last 120 hours)     None          Assessment/Plan:    Pharmacist consulted to dose vancomycin for Sepsis, goal trough range 15-20 mcg/mL.  1. Initiate vancomycin 750mg IV every q18 hours (12.2 mg/kg actual body weight).  2. No vancomycin level available for assessment.  3. Pharmacist will plan to check a vancomycin trough level prior to 4th dose, sooner if clinically indicated .  4. Pharmacist will continue to follow.    Thank you for the consult.  Malathi Bowens RP 10/5/2019 2:57 PM

## 2021-06-02 NOTE — PROGRESS NOTES
Patient was taken to surgery this morning at 0930 for a lap-andrews. Patient returned to the unit at 1250. Report from PACU stated that patient's pain was at 0/10, vitals were stable, pt was ready to return to the unit.  Patient arrived, vitals were stable, temp was 98.3.  By 1305 patient had started shivering and his pain had increased to 10/10. 0.4mg IV dilaudid was administered with no effect, Bare hugger was placed. Oxygen saturation began to drop ranging from 78-88 on 3L oxygen; was increased to 6L with no effect. Respiratory Therapy was notified, charge RN brought a mask to replace NC. Patient began to be tachy running up to 160. Hospitalist was notified of changed, rapid response was called.   Patient transferred to the ICU, report given to RN on that unit.

## 2021-06-02 NOTE — PROGRESS NOTES
Called to  Rapid response.  Patient hyperventilating with strong exp wheeze.  Dr asked for a neb I suggested BiPAP. Got the Bipap and placed on patient before transfer.  Marcelo Morataya, LRT

## 2021-06-02 NOTE — PROGRESS NOTES
Disability statement completed and faxed to #[phone].    [place] paperwork completed and faxed to Francesco Yung @ #[phone], was told to fax to that number per pt.     Jean Ricci)

## 2021-06-02 NOTE — PROGRESS NOTES
[facility] Pulmonary/Critical Care Consult Team Note    Amish Chua,  1955, MRN [id number]  Admitting Dx: Abdominal pain, right upper quadrant [R10.11]  Elevated LFTs [R94.5]  Date / Time of Admission:  10/4/2019  9:55 AM    Intake/Output last 3 shifts:  I/O last 3 completed shifts:  In: 3772 [I.V.:3122; Blood:400; IV Piggyback:250]  Out: 1125 [Urine:1075; Blood:50]   Overnight Events  Received a fluid bolus  He is feeling much better  Wore the bipap overnight  He is on 2L NC this am  Has been up to the bathroom  He has not passed gas    Assessment/Plan: Amish Chua is a 63 y.o. male with PMHx of Chronic Hep B, who presents with nausea, vomiting, and abdominal pain with fever and concern for cholecystitis s/p lap choly with worsening fever and altered mental status transferred to the ICU.    ID: Fever to 103, lactate to 8,, Altered Mental Status - all resolved, except abdominal pain  - UA on admission was negative, Flu negative  - no elevation in bilirubin, some concern for ascending cholangitis with narrowing of biliary flow, but still patent  - LFTs marginally increased, normal lipase  - normal CT scan of abdomen pre and post cholecystectomy   - on bipap for tachypnea  - ABG without acidosis  - sending stool studies, EColi 0157, O&P, fecal leukocytes  - sending immunology for influenza- could explain high fevers, nausea, body aches, and leukopenia  - also sending for CMV and EBC/Mono    PULM:    - bipap PRN for work of breathing    CV: tachycardia and hypertension from pain  - Monitor on tele    GI: s/p cholecystectomy   - advance diet to clear liquids  - awaiting surgery recs for advancing diet  - Hx of chronic hep B (elevated LFTs and leukopenia?)  - GI proph    RENAL:   - baseline Cr of 1  - Follow BUN/Creatinine  - strict I/O's    Code Status: FULL CODE    ICU DAILY CHECKLIST           Can patient transfer out of MICU? no  FAST HUG:  Feeding:  Feeding: yes.    Carrillo:Not  "Indicated  Analgesia/Sedation:Yes  Thromboembolic prophylaxis:SCDs  HOB>30:  Yes  Stress Ulcer Protocol Active: yes; Mode: PPI/H2 Antagonist  Glycemic Control:   Lab Results   Component Value Date/Time    GLU 79 10/06/2019 05:07 AM     (H) 10/05/2019 01:52 PM     (H) 10/05/2019 06:53 AM    Any glucose > 180 no; Mode of Insulin Therapy: Not indicated  INTUBATED: NA  PHYSICAL THERAPY AND MOBILITY: Can patient have PT and mobility trial: yes Activity: ICU mobility protocol    Critical Care Time excluding procedures and family discussions greater than: 45 min    Tana Dyson DO  Pulmonary and Critical Care Attending  pgr [phone]    No Known Allergies    Meds: See MAR    Physical Exam:  /62   Pulse 96   Temp 98.8  F (37.1  C) (Oral)   Resp 24   Ht 4' 8\" (1.422 m)   Wt 118 lb 6.4 oz (53.7 kg)   SpO2 94%   BMI 26.54 kg/m    Intake/Output this shift:  I/O this shift:  In: 101 [IV Piggyback:101]  Out: 200 [Urine:200]  GEN:   NAD, sitting up in bed  HEENT: MMD, NC in place  CVS: regular rhythm, no murmurs  RESP: CTA BL, no wheezing, no rhonchi  ABD: incisions with bandaids in place, tender to abdominal pain in RUQ, soft, + bowel sounds,  no guarding, no rigidity  EXT: Warm, well perfused, no rashes, no edema  Vasc: tracy radial pulses intact  NEURO:  Follows commands, responds to verbal stimulus with     Pertinent Labs: Latest lab results in EHR personally reviewed.     Cultures: personally reviewed.   personally reviewed images:   Xr Chest 1 View Portable    Result Date: 10/5/2019  EXAM: XR CHEST 1 VIEW PORTABLE LOCATION: [facility] DATE/TIME: 10/5/2019 2:46 PM INDICATION: hypoxia COMPARISON: 10/4/2019     Lungs remain hypoinflated. Minimal left basilar atelectasis. Stable minimally enlarged cardiac silhouette. Stable mild pulmonary vascular congestion. No pneumothorax.    Xr C Arm Less Than One Hour    Result Date: 10/5/2019  Please see Operative or Procedure Note for " details on this exam or Radiology Report for body part of interest (if applicable).     Ct Abdomen Pelvis Without Oral With Iv Contrast    Result Date: 10/5/2019  EXAM: CT ABDOMEN PELVIS WO ORAL W IV CONTRAST LOCATION: [facility] DATE/TIME: 10/5/2019 2:10 PM INDICATION: Abdominal pain, fever, post-op STAT rapid response post surgical COMPARISON: 10/2/2019 TECHNIQUE: Helical enhanced thin-section CT scan of the abdomen and pelvis was performed following injection of IV contrast. Multiplanar reformats were obtained. Dose reduction techniques were used. CONTRAST: Iohexol (Omni) 100 mL FINDINGS: LUNG BASES: Trace bilateral pleural effusions. ABDOMEN: Trace ascites. Interval cholecystectomy. Small amount of intraperitoneal and abdominal wall gas from the operation. Normal liver, spleen, pancreas, adrenal glands and kidneys. PELVIS: No inflammatory changes. Small metallic ingested foreign body in the distal sigmoid colon, previously in the ascending colon. MUSCULOSKELETAL: Negative.     CONCLUSION: 1.  Expected postoperative changes status post cholecystectomy earlier today. 2.  Trace amount of pleural fluid bilaterally and trace ascites.        Patient Active Problem List   Diagnosis     Abdominal pain     Dyslipidemia     Diabetes mellitus, type 2 (H)     LUTHER (acute kidney injury) (H)     Severe sepsis (H)     Ascending cholangitis     Abdominal pain, right upper quadrant     Fever, unspecified fever cause       Tana Dyson DO  Pulmonary and Critical Care Attending  pgr [phone]

## 2021-06-02 NOTE — PROGRESS NOTES
Hospitalist Progress Note     Assessment/Plan:     Amish Chua 63-year-old male medical history of,, chronic who presented with right upper quadrant abdominal pain, fever, and  significant leukopenia concerning for severe sepsis.  MRCP was performed which showed dilated CBD with inability to rule out ampullary stricture on MRCP.  Due to suspicion for probable cholecystitis he underwent laparoscopic cholecystectomy on 10/5/2019.  Postoperatively, he developed severe abdominal pain, tachycardia, and respiratory distress.  CT abdomen/pelvis did not show any postoperative complications, perforation, or gas.  Lactic acid was 8 and the patient had recurrent fever to 103.  Antibiotic coverage was broadened from initial Zosyn to include vancomycin.  He was subsequently transferred to the ICU for close monitoring of his respiratory status.    Active Problem:   Possible sepsis secondary to cholecystitis:  Leukopenia with mild neutropenia, resolved:  Thrombocytopenia likely due to underlying liver disease:  Chronic hepatitis B:  Coagulopathy of liver disease:  Acute hypoxic failure, resolved:  -Seems like patient's presentation is consistent with infectious etiology chronic cholecystitis but clinical picture is not fully convincing.  Dilated CBD on MRCP but ALP/T bili are unremarkable.  Clinical picture not consistent with cholangitis.  Chest x-ray with minimal pulmonary vascular congestion but no pneumonia.  Repeat CT abdomen pelvis during rapid response for severe abdominal pain just with postoperative changes.  UA unremarkable.  Symptoms of high fever, body aches, tachycardia, and lactic acidosis have now resolved.  On broad-spectrum antibiotics with pending culture.  -No significant bleeding despite low platelets and elevated INR.  Received vitamin K and platelets preop/perioperatively    Plan:  -Continue Zosyn and vancomycin.  Follow-up on blood culture, virology results, stool studies.  De-escalate antibiotics as  appropriate.  -General surgery involved.    Dyslipidemia:  -Discontinued statin due to liver disease.    Type 2 diabetes mellitus::  -Holding glipizide and metformin.  Intermittently hypoglycemic.  Continue sliding scale insulin.    DVT prophylaxis: SCDs.    Subjective:     No acute events overnight.  Transitioned off BiPAP and onto room air.  Abdominal pain much better controlled.  Hemodynamically stable.  Confusion resolved.  No significant complaints currently.  Discussed with the patient and multiple family members.    Review of systems:     Please see Subjective.    Current Medications:     Scheduled Meds:    famotidine (PEPCID) injection  20 mg Intravenous BID    Or     famotidine  20 mg Oral BID     [START ON 10/7/2019] influenza vaccine (age 50-64 YR or egg allergy) FLUBLOK quad (PF) inj 2019-20  0.5 mL Intramuscular Prior to Discharge     insulin aspart (NovoLOG) injection   Subcutaneous TID with meals     piperacillin-tazobactam  3.375 g Intravenous Q8H     sodium chloride  10-30 mL Intravenous Q8H FIXED TIMES     sodium chloride  3 mL Intravenous Line Care     vancomycin  750 mg Intravenous Q18H     Continuous Infusions:    lactated ringers 125 mL/hr (10/05/19 0303)     PRN Meds:.acetaminophen, dextrose 50 % (D50W), glucagon (human recombinant), HYDROcodone-acetaminophen, HYDROmorphone, naloxone **OR** naloxone, sodium chloride bacteriostatic, sodium chloride bacteriostatic, sodium chloride bacteriostatic, sodium chloride, sodium chloride, sodium chloride    Objective:       Vital signs in last 24 hours:     Temp:  [98.1  F (36.7  C)-103  F (39.4  C)] 98.6  F (37  C)  Heart Rate:  [] 98  Resp:  [12-36] 23  BP: ()/(50-78) 93/58  FiO2 (%):  [24 %-60 %] 24 %  Weight: 118 lb 6.4 oz (53.7 kg)    Physical Exam:     General appearance: Alert, Awake, No distress   Head & Neck Atraumatic, supple, no tracheal deviation   ENT  PER, conjunctiva clear, no scleral icterus, EOMI, no nasal discharge   Resp:   Breathing labored, tachypnea, clear to auscultation bilaterally, no wheezing, no crackles,   CVS: S1, S2 normal, regular rate and regular rhythm, no murmurs   GI: Soft, nontender, ND and BS+   MSK: No swelling, or tenderness   Neuro:  Alert and oriented ×3     Intake/Output this shift:     I/O last 3 completed shifts:  In: 3917 [P.O.:400; I.V.:2885; IV Piggyback:632]  Out: 1650 [Urine:1650]    Pertinent Labs:     Lab Results: personally reviewed.     Pertinent Radiology:       Advanced Care Planning:   More than 35 minutes were spent with the patient and family with more than 50% of the time spent on counseling and coordination of care.     Barrier to discharge: Possible sepsis.  Anticipated LOS: To be determined  Disposition: To be determined    Lora Cid M.D.  [facility] [employer] Hospitalist  Internal Medicine

## 2021-06-02 NOTE — PLAN OF CARE
Problem: Discharge Barriers  Goal: Patient s discharge needs will be met  Outcome: Progressing  Care Plan-Care Management    Care Management Goals of the Day:   Follow progression of care.     Care Progression to be reviewed with MD and RN CM: Updates to be provided to Charge RN, CMSW, HUC,therapy and other disciplines as indicated.    Barriers to Discharge and plan:  Sepsis (possibly secondary to cholecystitis) requiring IV Zosyn: Per ID note, plan continue IV Zosyn for 3-5 days postoperatively (cholecystectomy was done 10/5/19).     Discharge Disposition:   Goal is home.    Expected Discharge Date:  10/9/19 or 10/10/19 pending response to treatment.     Transportation:  Family.     Care Management/Coordination Narrative:   Patient is a Hmong speaking gentleman but does understand and speak come English. He is  and independent at baseline[place] No CM needs anticipated but CM will continue to monitor progression of care, team recommendations and provide discharge planning assist as needed.      Abbreviation  Code:  [facility] ([facility]), [facility] ([facility]), Patient (pt), Transitional Care Unit (TCU), Skilled Nursing Facility (SNF), Long Term Care (LTC), Assisted Living (group home or AL), Care Management (CM), Physical Therapy (PT), Occupational Therapy (OT), Speech Therapy (ST), Respiratory Therapy (RT).

## 2021-06-02 NOTE — H&P
Admission History and Physical   Amish Chua,  1955, MRN [id number]    [facility]  Abdominal pain, right upper quadrant [R10.11]  Elevated LFTs [R94.5]    PCP: Whit Max MD, [unfilled], [phone]   Code status:  Full Code       Extended Emergency Contact Information  Primary Emergency Contact: PAUL YUNG  Mobile Phone: [phone]  Relation: Spouse  Secondary Emergency Contact: ANASTACIO YUNG  Mobile Phone: [phone]  Relation: Child       Assessment and Plan   64 yo Hmong M with h/o DM2, chronic hep B, and dyslipidemia who presents with severe sepsis likely related to possible ascending cholangitis.  He has leukopenia and is developing worsening thrombocytopenia and has had right upper quadrant pain for couple months suggesting he may have a stricture there in his common bile duct and then started getting fevers 2 to 3 days ago.  We are repeating some labs and he has been hydrated and I have discussed with GI and if he worsens more than he may need ERCP emergently tonight.    Principal Problem:    Severe sepsis possibly due to Ascending cholangitis.  Right upper quadrant abdominal pain for 2 months, decreased appetite, and dilation of his common bile duct with possible ampullary stricture on MRCP, then developing fever and nausea and vomiting in the past few days suggests developing ascending cholangitis.  Discussed with GI and will follow closely.  May need ERCP.    Active Problems:    Right upper quadrant abdominal pain -going on for 2 months with decreased appetite.  Certainly suggestive of biliary stricture/obstruction.  His gallbladder is distended but does not appear infected on either ultrasound or MRCP or CT.    Dyslipidemia -has been on a statin which I am going to hold due to his elevated LFTs    Diabetes mellitus, type 2 -has been on glipizide and metformin.  I am going to hold both of these as he will be n.p.o. for now and just follow him with sliding scale insulin.   Also his lactic acid is going up and so this is another reason to hold metformin.    LUTHER (acute kidney injury) -presumably due to sepsis as above.  I will hydrate aggressively and follow.  No signs of obstruction on imaging.    Leukopenia and thrombocytopenia -likely due to severe sepsis          DVT Prophylaxis: SCD's given thrombocytopenia     Chief Complaint: Fever with RUQ abd pain     HPI:    Amish Chua is a 63 y.o. old male [facility] [place] with h/o DM2, chronic hep B, and dyslipidemia who presents with above symptoms.  He thinks he started having right upper quadrant abdominal pain about 2 months ago.  It has been relatively constant over the past 2 months.  He has had some decreased appetite over the past couple months and he thinks his last a few pounds but less than 5 total.  He has been able to work through this and the pain has been moderate to severe in severity dull achy type pain.  Then 3 days ago he started getting a fever along with some nausea and vomiting he also had some chills.  He went to the ER 2 days ago where they did a CT of his abdomen and this just showed gallstones with some mild biliary dilatation but nothing acute appearing.  His white blood cell count was normal and he was afebrile so they sent him home.  He followed up with his primary care physician this morning who noted that his blood pressure was a little low and he was still having significant pain and fevers at home so she sent him to the emergency room.  Here they did an ultrasound which showed a common bile duct dilated to 16 mm.  His gallbladder is distended with stones but no signs of wall thickening or inflammation around the gallbladder.  His blood pressure was stable in the emergency room.  He continues to have severe pain.  He has not had any diarrhea or melena or hematochezia or dysuria or hematuria.  No cold or flu symptoms.  No cough or chest pain or shortness of breath or runny nose or sore throat.  No skin lesions or rashes  anywhere.  History is provided by patient and chart review       Medical History  Past Medical History:   Diagnosis Date     Diabetes mellitus, type 2 (H)      Dyslipidemia         Surgical History  He  has a past surgical history that includes none.       Social History  Reviewed, and he  reports that he has quit smoking. His smoking use included cigarettes. He has a 10.50 pack-year smoking history. He has never used smokeless tobacco. He reports that he does not drink alcohol or use drugs.   Social History     Tobacco Use     Smoking status: Former Smoker     Packs/day: 0.30     Years: 35.00     Pack years: 10.50     Types: Cigarettes     Smokeless tobacco: Never Used   Substance Use Topics     Alcohol use: Never     Frequency: Never          Allergies  No Known Allergies Family History  Reviewed, and   Family History   Problem Relation Age of Onset     No Medical Problems Mother      No Medical Problems Father      No Medical Problems Sister      No Medical Problems Brother    His parents  of old age back in [facility] he is not aware of any medical problems in his siblings          Prior to Admission Medications   Medications Prior to Admission   Medication Sig Dispense Refill Last Dose     acetaminophen (TYLENOL) 325 MG tablet Take 325-650 mg by mouth every 4 (four) hours as needed. Every 4-6 hours as needed.        atorvastatin (LIPITOR) 40 MG tablet Take 40 mg by mouth daily.          10/3/2019     glipiZIDE (GLUCOTROL XL) 2.5 MG 24 hr tablet Take 2.5 mg by mouth daily with breakfast.   10/3/2019     metFORMIN (GLUCOPHAGE) 1000 MG tablet Take 1,000 mg by mouth 2 (two) times a day with meals.   10/3/2019     ondansetron (ZOFRAN) 4 MG tablet Take 1 tablet (4 mg total) by mouth every 6 (six) hours. 12 tablet 0 10/4/2019 at AM          Review of Systems:  A 12 point comprehensive review of systems was negative except as noted. Physical Exam:  Temp:  [99  F (37.2  C)-99.7  F (37.6  C)] 99  F (37.2  C)  Heart Rate:   "[] 92  Resp:  [13-20] 18  BP: ()/(54-59) 102/55    /55   Pulse 92   Temp 99  F (37.2  C)   Resp 18   Ht 4' 8\" (1.422 m)   Wt 135 lb 8 oz (61.5 kg)   SpO2 96%   BMI 30.38 kg/m      General Appearance:   Older male in mild distress appearing very fatigued and lethargic   Head:    Normocephalic, without obvious abnormality, atraumatic   Eyes:    PERRL, conjunctiva/corneas clear, EOM's intact,both eyes    Ears:    Normal external ear canals no drainage or erythema bilat.   Nose:   Nares normal by gross inspection,  mucosa normal, no drainage or sinus tenderness   Throat:   Lips, mucosa, and tongue normal; teeth and gums normal   Neck:   Supple, symmetrical, trachea midline, no adenopathy;        thyroid:  No enlargement/tenderness/nodules   Back:     Symmetric, no curvature, ROM normal, no CVA tenderness   Lungs:    Clear to auscultation   Chest wall:    No tenderness or deformity   Heart:    Regular rate and rhythm, S1 and S2 normal, I/VI systolic murmur, no rubs, no JVD, no edema   Abdomen:     Soft, tender in the right upper quadrant no rebound or guarding, bowel sounds active all four quadrants,     no masses, no hepatosplenomegaly   Musculoskeletal:   Extremities are warm and non-tender, atraumatic, no joint swelling or tenderness   Pulses:   2+ and symmetric all extremities   Skin:   Skin color, texture, turgor normal, no rashes or lesions on exposed areas, please see nursing assessment for full skin assessment   Neurologic:  Lethargic but arouses and answers questions appropriately.  Cranial nerves II through XII intact, motor strength 5 out of 5 upper and lower extremities symmetric, sensory grossly intact to light touch        Pertinent Labs  Lab Results: personally reviewed.   Results from last 7 days   Lab Units 10/04/19  1006 10/02/19  2035   LN-SODIUM mmol/L 136 135*   LN-POTASSIUM mmol/L 4.0 4.1   LN-CHLORIDE mmol/L 103 101   LN-CO2 mmol/L 25 24   LN-BLOOD UREA NITROGEN mg/dL 16 14 "   LN-CREATININE mg/dL 1.40* 1.11   LN-CALCIUM mg/dL 9.1 9.1   LN-ALBUMIN g/dL 3.3* 3.8   LN-PROTEIN TOTAL g/dL 6.6 7.3   LN-BILIRUBIN TOTAL mg/dL 1.0 0.5   LN-ALKALINE PHOSPHATASE U/L 108 78   LN-ALT (SGPT) U/L 63* 15   LN-AST (SGOT) U/L 84* 21     Results from last 7 days   Lab Units 10/04/19  1006 10/02/19  2035   LN-WHITE BLOOD CELL COUNT thou/uL 3.0* 4.3   LN-HEMOGLOBIN g/dL 14.1 14.0   LN-HEMATOCRIT % 42.1 41.6   LN-PLATELET COUNT thou/uL 90* 125*   LN-NEUTROPHILS RELATIVE PERCENT % 72*  --    LN-MONOCYTES RELATIVE PERCENT % 3  --            MOST RECENT A1c, Iron, TIBC, Coags, TFTs  Lab Results   Component Value Date    INR 1.43 (H) 10/04/2019     No results found for: IRON, TRANSFERRIN  No results found for: TSH, T3FREE, FREET4      Pertinent Radiology  Radiology Results: Personally reviewed impression/s  EKG Results: personally reviewed.   Sinus tachycardia with left axis deviation    Advanced Care Planning  Discharge Planning discussed with patient  Anticipated Length of Stay in midnights (including a midnight in the Emergency Department after triage if applicable): 2-4 for evaluation and treatment of severe sepsis      Sylvain Gibson MD  Internal Medicine Hospitalist  10/4/2019

## 2021-06-02 NOTE — PROGRESS NOTES
Spiritual Care Note    Spiritual Assessment:  made a visit with patient this morning; wife is present. Wife shares she is feeling tired as she talks about coming in and out of the hospital with her . Wife just wants her  to feel better and get better. Wife and patient seem to value the power of prayer for healing and welcomed this from the .     Care Provided:   offered listening, support and prayer, which was appreciated.     Plan of Care: Spiritual care will continue to follow as part of patient's care team.    FOX Abraham, BCC

## 2021-06-02 NOTE — PLAN OF CARE
Problem: Pain  Goal: Patient's pain/discomfort is manageable  Outcome: Progressing     Problem: Safety  Goal: Patient will be injury free during hospitalization  Outcome: Progressing     Problem: Daily Care  Goal: Daily care needs are met  Outcome: Progressing   Pt denies pain his shift, no distress noted.   Anmol Waggoner

## 2021-06-02 NOTE — PROGRESS NOTES
Patient has has low blood sugar @ 61 @ 6:15 am. He was given D50ml IV due to patient's NPO status.  BG will be check around 0640.    Patient is alert, oriented, but sweaty. Will continue monitor patient and his blood glucose.    Watson RN [phone]

## 2021-06-02 NOTE — PROGRESS NOTES
General Surgery Progress Note    2 Days Post-Op    CHOLECYSTECTOMY, LAPAROSCOPIC WITH INTRA-OP CHOLANGIOGRAMS    Subjective:   Pt states he is feeling better.  Continues to report abdominal pain near his incision but the lower abdominal pain and generalized abdominal cramping from yesterday has resolved with return of bowel function.  He is tolerating clear liquid diet.  Breathing appears to be better today and he is remained afebrile.    Vitals:    10/07/19 0400 10/07/19 0500 10/07/19 0600 10/07/19 0700   BP: 109/65 103/69 100/60 106/66   Patient Position: Semi-glez      Pulse: 74 85 80 78   Resp: 13 18 14 15   Temp: 97.9  F (36.6  C)      TempSrc: Oral      SpO2: 97% 97% 97% 96%   Weight:  116 lb (52.6 kg)     Height:           Physical Exam:  General: NAD, pleasant and cooperative  Ab:       Soft, not distended, expected TTP POD 2; The wound/ dressings are clean, dry, and intact  :      Patient is voiding adequate amount    Results from last 7 days   Lab Units 10/07/19  0504   LN-WHITE BLOOD CELL COUNT thou/uL 4.5   LN-HEMOGLOBIN g/dL 9.5*   LN-HEMATOCRIT % 28.4*   LN-PLATELET COUNT thou/uL 87*       Results from last 7 days   Lab Units 10/07/19  0504   LN-SODIUM mmol/L 137   LN-POTASSIUM mmol/L 3.5   LN-CHLORIDE mmol/L 107   LN-CO2 mmol/L 22   LN-BLOOD UREA NITROGEN mg/dL 15   LN-CREATININE mg/dL 0.84   LN-CALCIUM mg/dL 7.4*   LN-ALBUMIN g/dL 2.3*   LN-PROTEIN TOTAL g/dL 4.8*   LN-BILIRUBIN TOTAL mg/dL 0.5   LN-ALKALINE PHOSPHATASE U/L 81   LN-ALT (SGPT) U/L 55*   LN-AST (SGOT) U/L 67*       Assessment:  Patient admitted with severe sepsis with suspicion of cholecystitis now status post laparoscopic cholecystectomy with negative cholangiograms.  Patient was hypotensive, tachycardic, and hypoxic after surgery and sent to the ICU.  Abdominal pain is improved and patient appears to be doing better.  Still no clear etiology for his high fevers, but he seems to be improving.  From a purely surgical standpoint  he's doing well and can progress his diet    Plan:   Medical management per [facility] and ICU team appreciated  Diet as tolerated  Ambulate    Tammy Neil, CNP  [phone]  [facility]

## 2021-06-02 NOTE — PLAN OF CARE
Problem: Discharge Barriers  Goal: Patient s discharge needs will be met  Outcome: Progressing  Care Plan-Care Management    Care Management Goals of the Day:   Follow progression of care.     Care Progression to be reviewed with MD and RN CM: Updates to be provided to Charge RN, CMSW, HUC,therapy and other disciplines as indicated.    Barriers to Discharge and plan:  Sepsis (possibly secondary to cholecystitis) requiring ICU level of care. Currently receiving IV Vancomycin and IV Zosyn. Clear liquids, IV Pepcid. General surgery following. Possible step-down today if stable off BiPAP.     Discharge Disposition:   Goal is home.    Expected Discharge Date:  10/9/19 or 10/10/19 pending response to treatment.     Transportation:  Family.     Care Management/Coordination Narrative:   Patient is a Hmong speaking gentleman but does understand and speak come English. He is  and independent at baseline, [place]. No CM needs anticipated but CM will continue to monitor progression of care, team recommendations and provide discharge planning assist as needed.      Abbreviation  Code:  [facility] ([facility]), [facility] ([facility]), Patient (pt), Transitional Care Unit (TCU), Skilled Nursing Facility (SNF), Long Term Care (LTC), Assisted Living ([facility] or AL), Care Management (CM), Physical Therapy (PT), Occupational Therapy (OT), Speech Therapy (ST), Respiratory Therapy (RT).

## 2021-06-02 NOTE — PROGRESS NOTES
[facility] Pulmonary/Critical Care Consult Team Note    Amish Chua,  1955, MRN [id number]  Admitting Dx: Abdominal pain, right upper quadrant [R10.11]  Elevated LFTs [R94.5]  Date / Time of Admission:  10/4/2019  9:55 AM    Intake/Output last 3 shifts:  I/O last 3 completed shifts:  In:  [I.V.:1650; Blood:400]  Out: 450 [Urine:400; Blood:50]    Assessment/Plan: Amish Chua is a 63 y.o. male with PMHx of Chronic Hep B, who presents with nausea, vomiting, and abdominal pain with fever and concern for cholecystitis s/p lap choly with worsening fever and altered mental status transferred to the ICU.    ID: Fever to 103, lactate to 8,, Altered Mental Status  - only clinical history of issues with abdominal pain  - no coughing, no chest pain, no back pain  - UA on admission was negative, Flu negative  - no elevation in bilirubin, some concern for ascending cholangitis with narrowing of biliary flow, but still patent  - LFTs marginally increased, normal lipase  - normal CT scan of abdomen pre and post cholecystectomy   - repeating lactate  - giving bicarb and fluid  - on bipap for tachypnea  - ABG without acidosis  - sending stool studies, EColi 0157, O&P, fecal leukocytes  - sending immunology for influenza- could explain high fevers, nausea, body aches, and leukopenia  - also sending for CMV and EBC/Mono    PULM: ABG unremarkable  - bipap PRN for work of breathing    CV: tachycardia and hypertension from pain  - Monitor on tele    GI: s/p cholecystectomy   - NPO while lethargic on bipap  - Hx of chronic hep B (elevated LFTs and leukopenia?)  - GI proph    RENAL:   - baseline Cr of 1  - Follow BUN/Creatinine  - strict I/O's    Code Status: FULL CODE    ICU DAILY CHECKLIST           Can patient transfer out of MICU? no  FAST HUG:  Feeding:  Feeding: No.    [place]:Not Indicated  Analgesia/Sedation:Yes  Thromboembolic prophylaxis:SCDs  HOB>30:  Yes  Stress Ulcer Protocol Active: yes; Mode: PPI/H2  "Antagonist  Glycemic Control:   Lab Results   Component Value Date/Time     (H) 10/05/2019 01:52 PM     (H) 10/05/2019 06:53 AM    GLU 92 10/04/2019 08:21 PM    Any glucose > 180 no; Mode of Insulin Therapy: Not indicated  INTUBATED: NA  PHYSICAL THERAPY AND MOBILITY: Can patient have PT and mobility trial: yes Activity: ICU mobility protocol    Critical Care Time excluding procedures and family discussions greater than: 1 Hour    Tana Dyson DO  Pulmonary and Critical Care Attending  pgr [phone]    No Known Allergies    Meds: See MAR    Physical Exam:  /63   Pulse (!) 126   Temp (!) 103.1  F (39.5  C) (Axillary)   Resp 20   Ht 4' 8\" (1.422 m)   Wt 135 lb 8 oz (61.5 kg)   SpO2 99%   BMI 30.38 kg/m    Intake/Output this shift:  No intake/output data recorded.  GEN:   NAD  HEENT: Pupils reactive, bipap mask in place  CVS: regular rhythm, no murmurs  RESP: CTA BL, no wheezing, no rhonchi  ABD: No abdominal pain with palpation, no guarding, no rigidity  EXT: Warm, well perfused, no rashes, no edema  Vasc: tracy radial pulses intact  NEURO:  Follows commands, responds to verbal stimulus with , weak but symmetric    Pertinent Labs: Latest lab results in EHR personally reviewed.     Cultures: personally reviewed.   personally reviewed images:   Xr Chest 1 View Portable    Result Date: 10/5/2019  EXAM: XR CHEST 1 VIEW PORTABLE LOCATION: [facility] DATE/TIME: 10/5/2019 2:46 PM INDICATION: hypoxia COMPARISON: 10/4/2019     Lungs remain hypoinflated. Minimal left basilar atelectasis. Stable minimally enlarged cardiac silhouette. Stable mild pulmonary vascular congestion. No pneumothorax.    Xr C Arm Less Than One Hour    Result Date: 10/5/2019  Please see Operative or Procedure Note for details on this exam or Radiology Report for body part of interest (if applicable).     Mr Mrcp With Without Contrast    Result Date: 10/4/2019  EXAM: MR MRCP W WO CONTRAST LOCATION: [facility] DATE/TIME: 10/4/2019 5:49 PM INDICATION: Cholelithiasis; biliary obstruction suspected. Abdominal pain, RUQ with elevated LFTs and CBD dilation on US, abd pain for 2 months. COMPARISON: None. TECHNIQUE: Routine MR liver/pancreas protocol including axial and coronal MRCP sequences. 2D and 3D reconstruction performed by MR technologist including MIP reconstruction and slab cholangiograms. If performed with contrast, additional dynamic T1 post IV contrast images. CONTRAST: Gadavist 7 mL from a 7.5 mm vile FINDINGS: MRCP: Distended gallbladder with gallstones. There are no filling defects demonstrated in the common bile duct. The common bile duct is mildly dilated at 0.9 cm. This could be due to stricture formation at the ampulla. LIVER: Right hepatic lobe 0.8 cm hypervascular nodule without any abnormality noted on the diffusion-weighted images likely shunting. The remainder of the liver is normal. PANCREAS: No mass. No ductal dilatation. ADDITIONAL FINDINGS: Adrenal glands, kidneys, and spleen normal.     1.  Distended gallbladder with gallstones. Mild dilatation of the common bile duct without filling defects; ampullary stricture formation cannot be excluded.    Ct Abdomen Pelvis Without Oral With Iv Contrast    Result Date: 10/5/2019  EXAM: CT ABDOMEN PELVIS WO ORAL W IV CONTRAST LOCATION: [facility] DATE/TIME: 10/5/2019 2:10 PM INDICATION: Abdominal pain, fever, post-op STAT rapid response post surgical COMPARISON: 10/2/2019 TECHNIQUE: Helical enhanced thin-section CT scan of the abdomen and pelvis was performed following injection of IV contrast. Multiplanar reformats were obtained. Dose reduction techniques were used. CONTRAST: Iohexol (Omni) 100 mL FINDINGS: LUNG BASES: Trace bilateral pleural effusions. ABDOMEN: Trace ascites. Interval cholecystectomy. Small amount of intraperitoneal and abdominal wall gas from the operation. Normal liver, spleen, pancreas, adrenal glands and  kidneys. PELVIS: No inflammatory changes. Small metallic ingested foreign body in the distal sigmoid colon, previously in the ascending colon. MUSCULOSKELETAL: Negative.     CONCLUSION: 1.  Expected postoperative changes status post cholecystectomy earlier today. 2.  Trace amount of pleural fluid bilaterally and trace ascites.        Patient Active Problem List   Diagnosis     Abdominal pain     Dyslipidemia     Diabetes mellitus, type 2 (H)     LUTHER (acute kidney injury) (H)     Severe sepsis (H)     Ascending cholangitis     Abdominal pain, right upper quadrant       Tana Dyson DO  Pulmonary and Critical Care Attending  pgr [phone]

## 2021-06-02 NOTE — PLAN OF CARE
Problem: Pain  Goal: Patient's pain/discomfort is manageable  Outcome: Progressing     Problem: Safety  Goal: Patient will be injury free during hospitalization  Outcome: Progressing     Problem: Daily Care  Goal: Daily care needs are met  Outcome: Progressing   Pt denies pain, no distress noted. Noted to be resting comfortably in room with spouse who is helping with translations.Pillo Waggoner

## 2021-06-02 NOTE — PLAN OF CARE
Care Plan  Care Management       Care Management Goals of the Day: Progression of care and discharge planning.    Care Progression Reviewed With: Charge Nurse, Medical Doctor, Health Unit Coordinator, Nurse Care Manager    Barriers to Discharge: infection, completion of IV antibiotic    Discharge Disposition: home    Expected Discharge Date: 10/10/19    Transportation: family      Care Coordination Narrative: Surgery following, POD 4 lap cholecystectomy, will complete IV Zosyn.    Patient independent at baseline and will discharge home with support from spouse.

## 2021-06-02 NOTE — SIGNIFICANT EVENT
RAPID RESPONSE DOCUMENTATION  Date: 10/5/2019  Reason for Rapid Response: Respiratory Distress  Start Time: 1:50 pm    House officer paged to room 3551 for rapid response re Amish Chua. Upon arrival Hospitalist present at bedside assessing patient and managing situation. House officer signed off at this time.    Sakina Kennedy MD PGY-1  [facility] Family Medicine  [phone] (pager)

## 2021-06-02 NOTE — OP NOTE
Operative Note    Name:  Amish Chua  PCP:  Whit Max MD  Procedure Date:  10/5/2019      Procedure:  CHOLECYSTECTOMY, LAPAROSCOPIC WITH INTRA-OP CHOLANGIOGRAMS (N/A)    Pre-Procedure Diagnosis:  Abdominal pain, right upper quadrant [R10.11]     Post-Procedure Diagnosis:    Same     Surgeon(s):  Kinjal You MD      Anesthesia Type:  General      Findings:  Somewhat dilated gallbladder.  More indicative of chronic cholecystitis.  I do not believe this is the source of his fever.  Tapered distal common bile duct.  Good flow into the duodenum.    Operative Report:    The patient was brought to the operating suite where he was placed in the supine position.  He was prepped and draped in a sterile fashion after general anesthesia was administered.  A small incision was made below the umbilicus and the Veress needle passed into the abdominal cavity which was insufflated with carbon dioxide.  A 5 mm trocar port was then placed followed by the camera.  Under direct vision a 10 mm port was placed in the epigastric region.  Two 5 mm ports were placed in the right upper quadrant under direct vision.  With traction on the gallbladder dissection was carried out in the [place] of [place] and the cystic duct and artery were easily identified and dissected free.  A clip was placed up against the gallbladder and a small enterotomy made in the cystic duct.  Cholangiocatheter was passed through separate stab incision and passed into the duct and held firm with a clip.  Cholangiograms were obtained which showed no filling defect and good flow into the duodenum.  There did seem to be some tapering of the distal common bile duct but I could not say for sure that this was a stricture.  The radiologist did not feel that it was a significant stricture.  The camera was then replaced and the catheter removed from the duct.  The duct was then clipped and divided as was the artery.  The gallbladder was removed from the gallbladder bed  with electrocautery with no difficulty and pulled up through the epigastric incision.   The port was replaced and the abdomen was irrigated until clear.  Hemostasis was assured.  Although I had very good hemostasis, because of his history of low platelet count and a mildly elevated INR, I treated the bed of the liver with Alina.  All  the ports were removed and each site closed with subcuticular sutures of 4-0 Monocryl.  Each site was infiltrated with quarter percent Marcaine.  Sterile dressings were placed.  The patient tolerated the procedure well.        Estimated Blood Loss:   50 mL from 10/5/2019 10:11 AM to 10/5/2019 11:34 AM    Specimens:    Gallbladder and stones       Drains:        Complications:    None    Kinjal You     Date: 10/5/2019  Time: 11:36 AM

## 2021-06-02 NOTE — DISCHARGE SUMMARY
[facility] MEDICINE  DISCHARGE SUMMARY     Primary Care Physician: Whit Max MD  Admission Date: 10/4/2019   Discharge Provider: Unique Patton Discharge Date: 10/10/2019   Diet: diabetic diet   Code Status: Full Code   Activity: activity as tolerated        Condition at Discharge: Good      REASON FOR PRESENTATION(See Admission Note for Details)   Abdominal pain, fever     PRINCIPAL & ACTIVE DISCHARGE DIAGNOSES     Principal Problem:    Severe sepsis (H)    Acute on chronic cholecystitis   Active Problems:    Cholelithiasis    Abdominal pain    Dyslipidemia    Diabetes mellitus, type 2 (H)    LUTHER (acute kidney injury) (H)    Abdominal pain, right upper quadrant    Fever, unspecified fever cause      SIGNIFICANT FINDINGS (Imaging, labs):   CT ABDOMEN PELVIS 10/2/19: Calcified gallstones. Mild dilatation of the common bile duct. A calcified stone is not seen within the duct by CT. The proximal common bile duct measures 9 mm.    CT ABDOMEN PELVIS 10/5/19: Expected postoperative changes status post cholecystectomy earlier today.    MRCP 10/4/2019: Distended gallbladder with stones, mild dilatation of the CBD without filling defects    SURGICAL PATHOLOGY:    GALLBLADDER, CHOLECYSTECTOMY:     -  MIXED ACUTE AND CHRONIC CHOLECYSTITIS WITH CHOLELITHIASIS    PENDING LABS         PROCEDURES ( this hospitalization only)      CHOLECYSTECTOMY, LAPAROSCOPIC WITH INTRA-OP CHOLANGIOGRAMS    RECOMMENDATIONS TO OUTPATIENT PROVIDER FOR F/U VISIT     Follow-up with PCP in 1 week  Follow-up with surgery in 1 to 2 weeks    DISPOSITION     Home    SUMMARY OF HOSPITAL COURSE:      Amish Chua is a 63-year-old male with h/o DM2, chronic hep B, and dyslipidemia who presented with right upper quadrant abdominal pain, fever, and  significant leukopenia concerning for severe sepsis.  MRCP was performed which showed dilated gallbladder with gallstones and dilated CBD. Due to suspicion for probable cholecystitis patient  underwent laparoscopic cholecystectomy on 10/5/2019.  Pathology demonstrated acute on chronic cholecystitis and cholelithiasis.  Postoperatively, patient developed severe abdominal pain, tachycardia, and respiratory distress.  CT abdomen/pelvis did not show any postoperative complications, perforation, or gas.  Lactic acid was 8 and the patient had recurrent fever to 103.  Antibiotic coverage was broadened from initial Zosyn to include vancomycin.  Patient was subsequently transferred to the ICU for close monitoring of his respiratory status, however he recovered without any new infectious source identified.  Patient completed 5 days of IV Zosyn postoperatively.  He is doing well with return of bowel function to normal and able to tolerate p.o. intake.  He is still weak but has been able to ambulate with a walker.    Discharge Medications with Med changes:        Medication List      START taking these medications    oxyCODONE 5 MG immediate release tablet  Quantity:  5 tablet  Dose:  5 mg  Commonly known as:  ROXICODONE  5 mg, Oral, Every 4 hours PRN        CONTINUE taking these medications    acetaminophen 325 MG tablet  Dose:  325-650 mg  Commonly known as:  TYLENOL  325-650 mg, Oral, Every 4 hours PRN, Every 4-6 hours as needed.      atorvastatin 40 MG tablet  Dose:  40 mg  Commonly known as:  LIPITOR  40 mg, Oral, DAILY     glipiZIDE 2.5 MG 24 hr tablet  Dose:  2.5 mg  Commonly known as:  GLUCOTROL XL  2.5 mg, Oral, Daily with breakfast     metFORMIN 1000 MG tablet  Dose:  1,000 mg  Commonly known as:  GLUCOPHAGE  1,000 mg, Oral, 2 times daily with meals     ondansetron 4 MG tablet  Quantity:  12 tablet  Dose:  4 mg  Commonly known as:  ZOFRAN  4 mg, Oral, Every 6 hours                Rationale for medication changes:      Oxycodone PRN for postoperative pain        Consults   pulmonary/intensive care and general surgery      Immunizations given this encounter            Anticoagulation Information      Recent  "INR results:   Results from last 7 days   Lab Units 10/09/19  0502 10/05/19  0733 10/04/19  1027   LN-INR  1.16* 1.48* 1.43*     Warfarin doses (if applicable) or name of other anticoagulant: NA      Discharge Orders     Discharge Orders   Walker 4 Wheel Walker (with seat)   Order Comments: Height: 4' 8\" (1.422 m)     Weight: Wt Readings from Last 1 Encounters:  10/07/19 : 116 lb (52.6 kg)       Order Specific Question Answer Comments   Which DME provider? Kevin    Walker type: 4 Wheel Walker (with seat)    Accessories: N/A      Do not drive   Order Comments: Do NOT drive within 24 hours of needed prescription pain medication.     Weight Restrictions   Order Comments: Do not lift over 20 pounds for 2 weeks     Wash your hands with soap and warm water before you touch the site of surgery.     If you have skin tapes on your surgical site(s), leave them on the surgical site(s) until they fall off on their own,     Stitches under the skin will dissolve.     Shower/Bathing - Restrictions   Order Comments: Shower ok, no bathing or submersing incisions for 2 weeks.     Ice pack to wound 20 minutes at a time and as needed to reduce pain and/or swelling.     Call your doctor if: Pain is not controlled by pain medicine or pain that suddenly increases     Call your doctor if: You develop a fever greater than 101 and/or chills 24 hours after surgery.     Call your doctor if: Redness or bad smelling drainage at the surgery sites.     Call your doctor if: A large amount of bleeding from the surgery site that does not stop when moderate pressure is applied     Call your doctor if: Unable to pass urine within 8-10 hours after surgery     Call your doctor if: Upset stomach or vomiting lasting more than a day.     Call your doctor if: Skin reaction to tape or dressing such as redness, itching or rash at the surgery site.     Please see your surgeon for your follow-up appointment:   Order Comments: Call the clinic with any " questions or concerns:  [facility]   [facility]  [address]  [place] [address]  [phone]     Activity as tolerated   Order Comments: Rest when tired     Discharge diet - Regular     Call MD:  if symptoms get worse     Call MD for:  redness or swelling     Call MD:  if pain or burning when you urinate     Call MD for:  difficulty breathing, headache or visual disturbances     Call MD for:  temperature greater than 101     Call MD for:  severe uncontrolled pain     Call MD for:  constipation (difficulty having a bowel movement)     Call MD for:  dizziness, persistent nausea or vomiting     Call MD for:  weight gain - more than two pounds in a day or five pounds in a week     Discharge Follow Up - Primary Care Clinic     Order Specific Question Answer Comments   Follow-up in: Within 7 days    Non-[facility] patient Patient to schedule Follow-up appointment with Primary Care Physician      Examination     Vital Signs in last 24 hours:   Temp:  [97.9  F (36.6  C)-98.5  F (36.9  C)] 98.5  F (36.9  C)  Heart Rate:  [64-67] 64  Resp:  [16-20] 20  BP: (115-132)/(55-72) 115/55  SpO2:  [92 %-95 %] 92 %     Physical Exam:  GENERAL: No acute distress, sleepy  HEENT: Moist mucous membranes  CARDIAC: Regular rate & rhythm, S1 & S2 normal.  No gallops or murmurs. No edema  LUNGS: Clear to auscultation bilaterally  ABDOMEN: Soft, mild RUQ tenderness, incision CDI, bowel sounds present all quadrants  NEURO: Grossly intact      Please see EMR for more detailed significant labs, imaging, consultant notes etc.  Total time spent on discharge: 35 minutes    Unique Patton MD   [facility] Center[facility]: Ph:[phone]    CC:Whit Max MD

## 2021-06-02 NOTE — ANESTHESIA PREPROCEDURE EVALUATION
Anesthesia Evaluation      Patient summary reviewed   No history of anesthetic complications     Airway   Mallampati: II  Neck ROM: full   Pulmonary - negative ROS and normal exam                          Cardiovascular - normal exam  Exercise tolerance: > or = 4 METS  (+) , hypercholesterolemia,     ECG reviewed        Neuro/Psych      Endo/Other    (+) diabetes mellitus type 2, obesity,      GI/Hepatic/Renal    (+)   chronic renal disease, impaired hepatic function (Hep B)          Dental    (+) lower dentures and upper dentures                       Anesthesia Plan  Planned anesthetic: general endotracheal  25 mg ketamine IV on induction.    ASA 3 - emergent   Induction: intravenous   Anesthetic plan and risks discussed with: patient, spouse, child/children and  services used  Anesthesia plan special considerations: antiemetics,   Post-op plan: routine recovery

## 2021-06-02 NOTE — PLAN OF CARE
Problem: Pain  Goal: Patient's pain/discomfort is manageable  Outcome: Progressing     No pain at rest, noticed he was shivering  temp 103.2 and pulse in 120s, Dr Gibson present and patient placed on tele after returned from MRI

## 2021-06-02 NOTE — PROGRESS NOTES
"  RESPIRATORY CARE NOTE   BP 97/60   Pulse 85   Temp 98.7  F (37.1  C) (Oral)   Resp 16   Ht 4' 8\" (1.422 m)   Wt 135 lb 8 oz (61.5 kg)   SpO2 98%   BMI 30.38 kg/m      BS clear/diminished. Pt on BIPAP all shift. Settings: ST 12/6, 16 and 24%. Pt tolerating well. Will continue to monitor.     LANE NuñezT      "

## 2021-06-02 NOTE — PROGRESS NOTES
Pharmacy Note - Admission Medication History    Pertinent Provider Information: Please ensure that the patient's wife is aware of how the patient is to take medications on discharge.     ______________________________________________________________________    Prior To Admission (PTA) med list completed and updated in EMR.       PTA Med List   Medication Sig Last Dose     acetaminophen (TYLENOL) 325 MG tablet Take 325-650 mg by mouth every 4 (four) hours as needed. Every 4-6 hours as needed.      atorvastatin (LIPITOR) 40 MG tablet Take 40 mg by mouth daily.        10/3/2019     glipiZIDE (GLUCOTROL XL) 2.5 MG 24 hr tablet Take 2.5 mg by mouth daily with breakfast. 10/3/2019     metFORMIN (GLUCOPHAGE) 1000 MG tablet Take 1,000 mg by mouth 2 (two) times a day with meals. 10/3/2019     ondansetron (ZOFRAN) 4 MG tablet Take 1 tablet (4 mg total) by mouth every 6 (six) hours. 10/4/2019 at AM       Information source(s): The patient's guest, a prior medication history, outside medication records in [facility], and pharmacy records.    Summary of Changes to PTA Med List include:  New: Tylenol.  Discontinued: None.  Changed: Lipitor, metformin.    Patient's guest was asked about OTC/herbal products specifically.  PTA med list reflects this.    Based on the pharmacist s assessment, the PTA med list information appears somewhat reliable.    Patient appears adherent: Unknown.    Patient does not use any multi-dose medications prior to admission.    Thank you for the opportunity to participate in the care of this patient.    Misha Krishnamurthy, PharmD  10/4/2019 2:17 PM

## 2021-06-02 NOTE — PLAN OF CARE
Problem: Ineffective Airway Clearance  Goal: Maintain airway patency  Outcome: Progressing     Problem: Breathing  Goal: Patient will maintain patent airway  Outcome: Progressing     SHANNAN Flores

## 2021-06-02 NOTE — PLAN OF CARE
Problem: Ineffective Airway Clearance  Goal: Maintain airway patency  Outcome: Progressing     Problem: Breathing  Goal: Patient will maintain patent airway  Outcome: Progressing     SHANNAN Nuñez

## 2021-06-02 NOTE — PROGRESS NOTES
Patient arrived from [address] via wheelchair and accompanied by spouse.  Patient tolerated RA during transport, oxygen sats 94%.  IV Zosyn infusing at this time.  Band-Aid intact and dry to abdominal site.

## 2021-06-02 NOTE — PLAN OF CARE
Problem: Daily Care  Goal: Daily care needs are met  Outcome: Progressing    Report called to 4700 RN, transport to assist to 4720, wife accompanies patient

## 2021-06-02 NOTE — PLAN OF CARE
Problem: Pain  Goal: Patient's pain/discomfort is manageable  Outcome: Adequate for Discharge     Problem: Safety  Goal: Patient will be injury free during hospitalization  Outcome: Adequate for Discharge     Problem: Daily Care  Goal: Daily care needs are met  Outcome: Adequate for Discharge     Problem: Psychosocial Needs  Goal: Demonstrates ability to cope with hospitalization/illness  Outcome: Adequate for Discharge     Problem: Discharge Barriers  Goal: Patient's discharge needs are met  Outcome: Adequate for Discharge     Problem: Knowledge Deficit  Goal: Patient/family/caregiver demonstrates understanding of disease process, treatment plan, medications, and discharge instructions  Outcome: Adequate for Discharge     Pt discharged home today, discharge instructions explained to both Pt and spouse and they demonstrate understanding. No concerns verbalized, Pt denies pain, VSS. Given bottle of oxycodone per MD orders.

## 2021-06-02 NOTE — PLAN OF CARE
Problem: Pain  Goal: Patient's pain/discomfort is manageable  10/8/2019 1904 by Anmol Waggoner, RN  Outcome: Progressing     Problem: Safety  Goal: Patient will be injury free during hospitalization  Outcome: Progressing     Problem: Daily Care  Goal: Daily care needs are met  10/8/2019 1904 by Anmol Waggoner, RN  Outcome: Progressing  10/8/2019 1449 by Anmol Waggoner, RN  Outcome: Progressing   Pt verbalize abd. Pain x1, PRN Hydrocodone given, he reports relief. Noted to be comfortably sleeping with no distress.

## 2021-06-03 VITALS
HEIGHT: 60 IN | BODY MASS INDEX: 22.78 KG/M2 | BODY MASS INDEX: 22.78 KG/M2 | HEIGHT: 60 IN | WEIGHT: 116 LBS | WEIGHT: 116 LBS

## 2021-06-03 VITALS
HEART RATE: 83 BPM | DIASTOLIC BLOOD PRESSURE: 62 MMHG | HEIGHT: 60 IN | SYSTOLIC BLOOD PRESSURE: 96 MMHG | OXYGEN SATURATION: 95 % | BODY MASS INDEX: 19.24 KG/M2 | WEIGHT: 98 LBS

## 2021-06-09 NOTE — UTILIZATION REVIEW
Admission Status; Secondary Review Determination       Under the authority of the Utilization Management Committee, the utilization review process indicated a secondary review on the above patient. The review outcome is based on review of the medical records, discussions with staff, and applying clinical experience noted on the date of the review.     (x) Inpatient Status Appropriate - This patient's medical care is consistent with medical management for inpatient care and reasonable inpatient medical practice.     RATIONALE FOR DETERMINATION   Mr. Chua is a 62 yo male pt who presents with RUQ pain and sepsis.  He was febrile, tachycardic, hypotensive and leukopenic.  Noted to have elevated LFT's; imaging revealed cholelithiasis and biliary duct dilatation.  Clinical concern for ascending cholangitis and continues on IV zosyn.  Surgery consult requested; to OR this am.    At the time of admission with the information available to the attending physician more than 2 nights Hospital complex care was anticipated, based on patient risk of adverse outcome if treated as outpatient and complex care required. Inpatient admission is appropriate based on the Medicare guidelines.   The information on this document is developed by the utilization review team in order for the business office to ensure compliance. This only denotes the appropriateness of proper admission status and does not reflect the quality of care rendered.   The definitions of Inpatient Status and Observation Status used in making the determination above are those provided in the CMS Coverage Manual, Chapter 1 and Chapter 6, section 70.4.     Sincerely,     Tricia Martínez, DO  Utilization Review  Physician Advisor

## 2021-06-16 PROBLEM — R65.20 SEVERE SEPSIS (H): Status: ACTIVE | Noted: 2019-10-04

## 2021-06-16 PROBLEM — R10.9 ABDOMINAL PAIN: Status: ACTIVE | Noted: 2019-10-04

## 2021-06-16 PROBLEM — R10.11 ABDOMINAL PAIN, RIGHT UPPER QUADRANT: Status: ACTIVE | Noted: 2019-10-04

## 2021-06-16 PROBLEM — K83.09 ASCENDING CHOLANGITIS (H): Status: ACTIVE | Noted: 2019-10-04

## 2021-06-16 PROBLEM — A41.9 SEVERE SEPSIS (H): Status: ACTIVE | Noted: 2019-10-04

## 2021-06-16 PROBLEM — N17.9 AKI (ACUTE KIDNEY INJURY) (H): Status: ACTIVE | Noted: 2019-10-04

## 2021-06-19 NOTE — LETTER
Letter by Kat Zamora PA-C at      Author: Kat Zamora PA-C Service: -- Author Type: --    Filed:  Encounter Date: 10/22/2019 Status: Signed         October 22, 2019     Patient: Amish Chua   YOB: 1955   Date of Visit: 10/22/2019     Attention Dr. Huang,               Amish Chua followed up with myself for postop clinic after a laparoscopic cholecystectomy that was done at [facility] in [place].  Patient first presented to [facility] then later sent to [facility] in [place].      He originally presented with right upper quadrant abdominal pain, fever, and  significant leukopenia concerning for severe sepsis.  MRCP was performed which showed dilated gallbladder with gallstones and dilated CBD. Due to suspicion for probable cholecystitis patient underwent laparoscopic cholecystectomy on 10/5/2019.  Gallbladder did not appear to be infected.  She demonstrated acute on chronic cholecystitis and cholelithiasis.  Postoperatively, patient developed severe abdominal pain, tachycardia, and respiratory distress.  CT abdomen/pelvis did not show any postoperative complications, perforation, or gas.  Lactic acid was 8 and the patient had recurrent fever to 103.  Antibiotic coverage was broadened from initial Zosyn to include vancomycin.  Patient was subsequently transferred to the ICU for close monitoring of his respiratory status, however he recovered without any new infectious source identified.       During postop appointment was brought up to us with concerns of ongoing symptoms.  Has extreme weakness and is not ambulatory other than being helped to the restroom.  Has no appetite but occasionally does complain of hunger.  Not able to eat any regular food and has only been drinking supplemental drinks.  Ongoing epigastric abdominal pain with eating.  Feels that when he tries to drink or eat feels like something gets stuck in the mid epigastrium resulting in nausea and sometimes  vomiting.  Having some small regular stools.  Repeat his LFTs which were normal his lipase was 303 and had a normal white blood count and hemoglobin.  I am ordering a HIDA scan to rule out any type of issues with a sphincter of Oddi.  This will be done on the 28th.       Surgical standpoint there is no reason for him to have these symptoms postoperatively.  His surgical sites look good and his abdominal exam was fairly benign.  I recommended that he consult with [place] [facility] for further work-up.  There was extensive disability paperwork to be filled out.  Did place our surgical portion part in.  You have any further questions or concerns feel free to give us a call.  [facility]  [address] [place] [id number] [phone]    Sincerely,        Electronically signed by Kat Zamora PA-C

## 2021-06-19 NOTE — LETTER
Letter by Kat Zamora PA-C at      Author: Kat Zamora PA-C Service: -- Author Type: --    Filed:  Encounter Date: 10/22/2019 Status: Signed         October 22, 2019     Patient: Amish Chua   YOB: 1955   Date of Visit: 10/22/2019       Attention Simran Fox CNP     Amish Chua followed up with myself for postop clinic after a laparoscopic cholecystectomy that was done at [facility] in [place].  Patient first presented to [facility] then later sent to [facility] in [place].      He originally presented with right upper quadrant abdominal pain, fever, and  significant leukopenia concerning for severe sepsis.  MRCP was performed which showed dilated gallbladder with gallstones and dilated CBD. Due to suspicion for probable cholecystitis patient underwent laparoscopic cholecystectomy on 10/5/2019.  Gallbladder did not appear to be infected.  She demonstrated acute on chronic cholecystitis and cholelithiasis.  Postoperatively, patient developed severe abdominal pain, tachycardia, and respiratory distress.  CT abdomen/pelvis did not show any postoperative complications, perforation, or gas.  Lactic acid was 8 and the patient had recurrent fever to 103.  Antibiotic coverage was broadened from initial Zosyn to include vancomycin.  Patient was subsequently transferred to the ICU for close monitoring of his respiratory status, however he recovered without any new infectious source identified.       During postop appointment was brought up to us with concerns of ongoing symptoms.  Has extreme weakness and is not ambulatory other than being helped to the restroom.  Has no appetite but occasionally does complain of hunger.  Not able to eat any regular food and has only been drinking supplemental drinks.  Ongoing epigastric abdominal pain with eating.  Feels that when he tries to drink or eat feels like something gets stuck in the mid epigastrium resulting in nausea and sometimes  vomiting.  Having some small regular stools.  Repeat his LFTs which were normal his lipase was 303 and had a normal white blood count and hemoglobin.  I am ordering a HIDA scan to rule out any type of issues with a sphincter of Oddi.  This will be done on the 28th.       Surgical standpoint there is no reason for him to have these symptoms postoperatively.  His surgical sites look good and his abdominal exam was fairly benign.  I recommended that he consult with [place] [facility] for further work-up.  There was extensive disability paperwork to be filled out.  Did place our surgical portion part in.  You have any further questions or concerns feel free to give us a call.        She returned the following day concerned about disability. Medically he for sure is decompensated and not getting proper nutrition. I did fill out his paperwork to state a month off but he might need more depending on how he does and follow up should be with you if needs further disability.  She also reported a fever 102 so I also did get ultrasound ordered to for sure rule out any surgical complications such as a bile leak.         Electronically signed by Kat Zamora PA-C

## 2021-07-01 NOTE — PROCEDURES
"Procedures by Meredith Burris RN at 10/5/2019  3:54 PM     Author: Meredith Burris RN Service: -- Author Type: Registered Nurse    Filed: 10/5/2019  3:58 PM Date of Service: 10/5/2019  3:54 PM Status: Signed    : Meredith Burris RN (Registered Nurse)     Procedures    1. PICC AND MIDLINE TEAM LINE INSERTION [IVT34 (Custom)]             PICC Line Insertion Procedure Note  Pt. Name: Amish Chua  MRN:        [id number]    Procedure: Insertion of a triple Lumen  5 fr  Bard SOLO (valved) Power PICC, Lot number [id number]    Indications: Vasopressor    Contraindications : none    Procedure Details   Patient identified with 2 identifiers and \"Time Out\" conducted.  .     Central line insertion bundle followed: hand hygeine performed prior to procedure, site cleansed with cholraprep, hat, mask, sterile gloves,sterile gown worn, patient draped with maximum barrier head to toe drape, sterile field maintained.    The vein was assessed and found to be compressible and of adequate size. 1 ml 1% Lidocaine administered sq to the insertion site. A 5 Fr PICC was inserted into the basilic vein of the right arm with ultrasound guidance. 1 attempt(s) required to access vein.   Catheter threaded without difficulty. Good blood return noted.    Modified Seldinger Technique used for insertion.    The 8 sharps that are included in the PICC insertion kit were accounted for and disposed of in the sharps container prior to breakdown of the sterile field.    Catheter secured with Statlock, biopatch and Tegaderm dressing applied.    Findings:  Total catheter length  38 cm, with 2 cm exposed. Mid upper arm circumference is 28 cm. Catheter was flushed with 30 cc NS. Patient  tolerated procedure well.    Tip placement verified by [facility] techology . Tip placement in the SVC.    CLABSI prevention brochure left at bedside.    Patient's primary RN notified PICC is ready for use.    Comments:          Meredith Burris RN,BSN,[facility] " Access

## 2021-07-03 NOTE — ADDENDUM NOTE
Addendum Note by Kat Aguirre PA-C at 10/21/2019  2:15 PM     Author: Kat Aguirre PA-C Service: -- Author Type: Physician Assistant    Filed: 10/22/2019  2:52 PM Encounter Date: 10/21/2019 Status: Signed    : Kat Aguirre PA-C (Physician Assistant)    Addended by: KAT AGUIRRE on: 10/22/2019 02:52 PM        Modules accepted: Orders

## 2024-05-16 NOTE — PLAN OF CARE
Pt arrived to unit at 1415. Pt was tachycardic with HR in the 130s at rest, on BIPAP at 70% FiO2. Lactic acid elevated at 8, trops neg, cxry with no changes, CT showed expected changes per surgery this AM. Per MD Becerra, will hold off on fluids for now, pt now on a abx. Pt's temperature was elevated at 103.1. PICC line placed, family consented. Report given to DOREEN Fajardo. Will continue to monitor.    What Type Of Note Output Would You Prefer (Optional)?: Standard Output What Is The Reason For Today's Visit?: Full Body Skin Examination What Is The Reason For Today's Visit? (Being Monitored For X): the development of new lesions How Severe Are Your Spot(S)?: mild